# Patient Record
Sex: MALE | Race: WHITE | Employment: OTHER | URBAN - NONMETROPOLITAN AREA
[De-identification: names, ages, dates, MRNs, and addresses within clinical notes are randomized per-mention and may not be internally consistent; named-entity substitution may affect disease eponyms.]

---

## 2017-01-12 ENCOUNTER — TELEPHONE (OUTPATIENT)
Dept: PULMONOLOGY | Age: 77
End: 2017-01-12

## 2017-07-02 PROBLEM — G30.1 LATE ONSET ALZHEIMER'S DISEASE WITH BEHAVIORAL DISTURBANCE (HCC): Status: ACTIVE | Noted: 2017-07-02

## 2017-07-02 PROBLEM — F02.818 LATE ONSET ALZHEIMER'S DISEASE WITH BEHAVIORAL DISTURBANCE (HCC): Status: ACTIVE | Noted: 2017-07-02

## 2017-07-02 PROBLEM — R07.89 CHEST WALL PAIN: Status: ACTIVE | Noted: 2017-07-02

## 2017-07-02 PROBLEM — R41.0 MENTAL CONFUSION: Status: ACTIVE | Noted: 2017-07-02

## 2017-07-15 ENCOUNTER — APPOINTMENT (OUTPATIENT)
Dept: CT IMAGING | Age: 77
End: 2017-07-15
Payer: MEDICARE

## 2017-07-15 ENCOUNTER — HOSPITAL ENCOUNTER (EMERGENCY)
Age: 77
Discharge: NURSING, REHAB OR LONG-TERM CARE | End: 2017-07-15
Attending: EMERGENCY MEDICINE
Payer: MEDICARE

## 2017-07-15 ENCOUNTER — APPOINTMENT (OUTPATIENT)
Dept: GENERAL RADIOLOGY | Age: 77
End: 2017-07-15
Payer: MEDICARE

## 2017-07-15 VITALS
HEART RATE: 73 BPM | OXYGEN SATURATION: 95 % | DIASTOLIC BLOOD PRESSURE: 84 MMHG | TEMPERATURE: 98.6 F | SYSTOLIC BLOOD PRESSURE: 137 MMHG | RESPIRATION RATE: 20 BRPM | BODY MASS INDEX: 35.19 KG/M2 | WEIGHT: 205 LBS

## 2017-07-15 DIAGNOSIS — R04.2 HEMOPTYSIS: ICD-10-CM

## 2017-07-15 DIAGNOSIS — J06.9 UPPER RESPIRATORY TRACT INFECTION, UNSPECIFIED TYPE: Primary | ICD-10-CM

## 2017-07-15 DIAGNOSIS — K20.90 ESOPHAGITIS: ICD-10-CM

## 2017-07-15 DIAGNOSIS — R05.9 COUGH: ICD-10-CM

## 2017-07-15 LAB
ALBUMIN SERPL-MCNC: 3.3 G/DL (ref 3.5–5.1)
ALP BLD-CCNC: 53 U/L (ref 38–126)
ALT SERPL-CCNC: 30 U/L (ref 11–66)
AMORPHOUS: ABNORMAL
ANION GAP SERPL CALCULATED.3IONS-SCNC: 12 MEQ/L (ref 8–16)
AST SERPL-CCNC: 18 U/L (ref 5–40)
BACTERIA: ABNORMAL /HPF
BASOPHILS # BLD: 0.8 %
BASOPHILS ABSOLUTE: 0.1 THOU/MM3 (ref 0–0.1)
BILIRUB SERPL-MCNC: 0.3 MG/DL (ref 0.3–1.2)
BILIRUBIN DIRECT: < 0.2 MG/DL (ref 0–0.3)
BILIRUBIN URINE: NEGATIVE
BLOOD, URINE: NEGATIVE
BUN BLDV-MCNC: 21 MG/DL (ref 7–22)
CALCIUM SERPL-MCNC: 8.7 MG/DL (ref 8.5–10.5)
CASTS 2: ABNORMAL /LPF
CASTS UA: ABNORMAL /LPF
CHARACTER, URINE: ABNORMAL
CHLORIDE BLD-SCNC: 100 MEQ/L (ref 98–111)
CO2: 27 MEQ/L (ref 23–33)
COLOR: YELLOW
CREAT SERPL-MCNC: 0.8 MG/DL (ref 0.4–1.2)
CRYSTALS, UA: ABNORMAL
D-DIMER QUANTITATIVE: 3350 NG/ML FEU (ref 0–500)
EKG ATRIAL RATE: 85 BPM
EKG P AXIS: 27 DEGREES
EKG P-R INTERVAL: 136 MS
EKG Q-T INTERVAL: 372 MS
EKG QRS DURATION: 92 MS
EKG QTC CALCULATION (BAZETT): 442 MS
EKG R AXIS: 6 DEGREES
EKG T AXIS: 33 DEGREES
EKG VENTRICULAR RATE: 85 BPM
EOSINOPHIL # BLD: 0.1 %
EOSINOPHILS ABSOLUTE: 0 THOU/MM3 (ref 0–0.4)
EPITHELIAL CELLS, UA: ABNORMAL /HPF
GFR SERPL CREATININE-BSD FRML MDRD: > 90 ML/MIN/1.73M2
GLUCOSE BLD-MCNC: 101 MG/DL (ref 70–108)
GLUCOSE URINE: NEGATIVE MG/DL
HCT VFR BLD CALC: 39.7 % (ref 42–52)
HEMOGLOBIN: 13.1 GM/DL (ref 14–18)
KETONES, URINE: NEGATIVE
LACTIC ACID: 0.7 MMOL/L (ref 0.5–2.2)
LEUKOCYTE ESTERASE, URINE: NEGATIVE
LIPASE: 40.6 U/L (ref 5.6–51.3)
LYMPHOCYTES # BLD: 15 %
LYMPHOCYTES ABSOLUTE: 1.9 THOU/MM3 (ref 1–4.8)
MAGNESIUM: 2.5 MG/DL (ref 1.6–2.4)
MCH RBC QN AUTO: 29.3 PG (ref 27–31)
MCHC RBC AUTO-ENTMCNC: 33 GM/DL (ref 33–37)
MCV RBC AUTO: 88.6 FL (ref 80–94)
MISCELLANEOUS 2: ABNORMAL
MONOCYTES # BLD: 11.2 %
MONOCYTES ABSOLUTE: 1.4 THOU/MM3 (ref 0.4–1.3)
MUCUS: ABNORMAL
NITRITE, URINE: NEGATIVE
NUCLEATED RED BLOOD CELLS: 0 /100 WBC
OSMOLALITY CALCULATION: 280.7 MOSMOL/KG (ref 275–300)
PDW BLD-RTO: 13.9 % (ref 11.5–14.5)
PH UA: 8.5
PLATELET # BLD: 233 THOU/MM3 (ref 130–400)
PMV BLD AUTO: 8.6 MCM (ref 7.4–10.4)
POTASSIUM SERPL-SCNC: 4.6 MEQ/L (ref 3.5–5.2)
PRO-BNP: 79 PG/ML (ref 0–1800)
PROTEIN UA: NEGATIVE
RBC # BLD: 4.48 MILL/MM3 (ref 4.7–6.1)
RBC # BLD: NORMAL 10*6/UL
RBC URINE: ABNORMAL /HPF
RENAL EPITHELIAL, UA: ABNORMAL
SEG NEUTROPHILS: 72.9 %
SEGMENTED NEUTROPHILS ABSOLUTE COUNT: 9.2 THOU/MM3 (ref 1.8–7.7)
SODIUM BLD-SCNC: 139 MEQ/L (ref 135–145)
SPECIFIC GRAVITY, URINE: 1.02 (ref 1–1.03)
TOTAL PROTEIN: 6.2 G/DL (ref 6.1–8)
TROPONIN T: < 0.01 NG/ML
UROBILINOGEN, URINE: 0.2 EU/DL
WBC # BLD: 12.6 THOU/MM3 (ref 4.8–10.8)
WBC UA: ABNORMAL /HPF
YEAST: ABNORMAL

## 2017-07-15 PROCEDURE — 93005 ELECTROCARDIOGRAM TRACING: CPT | Performed by: EMERGENCY MEDICINE

## 2017-07-15 PROCEDURE — 83605 ASSAY OF LACTIC ACID: CPT

## 2017-07-15 PROCEDURE — 71275 CT ANGIOGRAPHY CHEST: CPT

## 2017-07-15 PROCEDURE — 6360000004 HC RX CONTRAST MEDICATION: Performed by: INTERNAL MEDICINE

## 2017-07-15 PROCEDURE — 83880 ASSAY OF NATRIURETIC PEPTIDE: CPT

## 2017-07-15 PROCEDURE — 71020 XR CHEST STANDARD TWO VW: CPT

## 2017-07-15 PROCEDURE — 81001 URINALYSIS AUTO W/SCOPE: CPT

## 2017-07-15 PROCEDURE — 82248 BILIRUBIN DIRECT: CPT

## 2017-07-15 PROCEDURE — 74176 CT ABD & PELVIS W/O CONTRAST: CPT

## 2017-07-15 PROCEDURE — 84484 ASSAY OF TROPONIN QUANT: CPT

## 2017-07-15 PROCEDURE — 80053 COMPREHEN METABOLIC PANEL: CPT

## 2017-07-15 PROCEDURE — 83690 ASSAY OF LIPASE: CPT

## 2017-07-15 PROCEDURE — 99285 EMERGENCY DEPT VISIT HI MDM: CPT

## 2017-07-15 PROCEDURE — 85025 COMPLETE CBC W/AUTO DIFF WBC: CPT

## 2017-07-15 PROCEDURE — 85379 FIBRIN DEGRADATION QUANT: CPT

## 2017-07-15 PROCEDURE — 83735 ASSAY OF MAGNESIUM: CPT

## 2017-07-15 PROCEDURE — 36415 COLL VENOUS BLD VENIPUNCTURE: CPT

## 2017-07-15 RX ORDER — 0.9 % SODIUM CHLORIDE 0.9 %
1000 INTRAVENOUS SOLUTION INTRAVENOUS ONCE
Status: DISCONTINUED | OUTPATIENT
Start: 2017-07-15 | End: 2017-07-15

## 2017-07-15 RX ORDER — PANTOPRAZOLE SODIUM 20 MG/1
40 TABLET, DELAYED RELEASE ORAL DAILY
Qty: 30 TABLET | Refills: 0 | Status: SHIPPED | OUTPATIENT
Start: 2017-07-15 | End: 2017-10-12 | Stop reason: CLARIF

## 2017-07-15 RX ORDER — AZITHROMYCIN 250 MG/1
TABLET, FILM COATED ORAL
Qty: 1 PACKET | Refills: 0 | Status: SHIPPED | OUTPATIENT
Start: 2017-07-15 | End: 2017-07-25

## 2017-07-15 RX ADMIN — IOPAMIDOL 85 ML: 755 INJECTION, SOLUTION INTRAVENOUS at 11:12

## 2017-07-15 ASSESSMENT — ENCOUNTER SYMPTOMS
SHORTNESS OF BREATH: 0
DIARRHEA: 0
COUGH: 1
EYE DISCHARGE: 0
RHINORRHEA: 0
ABDOMINAL PAIN: 1
EYE ITCHING: 0
WHEEZING: 0
NAUSEA: 0
ABDOMINAL DISTENTION: 0

## 2017-07-15 ASSESSMENT — PAIN SCALES - GENERAL: PAINLEVEL_OUTOF10: 6

## 2017-07-15 ASSESSMENT — PAIN DESCRIPTION - PAIN TYPE: TYPE: ACUTE PAIN

## 2017-07-15 ASSESSMENT — PAIN DESCRIPTION - FREQUENCY: FREQUENCY: CONTINUOUS

## 2017-07-15 ASSESSMENT — PAIN DESCRIPTION - ORIENTATION: ORIENTATION: MID

## 2017-07-15 ASSESSMENT — PAIN DESCRIPTION - LOCATION: LOCATION: CHEST

## 2017-07-15 ASSESSMENT — PAIN DESCRIPTION - DESCRIPTORS: DESCRIPTORS: ACHING

## 2017-08-20 ENCOUNTER — HOSPITAL ENCOUNTER (EMERGENCY)
Age: 77
Discharge: HOME | End: 2017-08-21
Payer: MEDICARE

## 2017-08-20 ENCOUNTER — APPOINTMENT (OUTPATIENT)
Dept: GENERAL RADIOLOGY | Age: 77
End: 2017-08-20
Payer: MEDICARE

## 2017-08-20 VITALS
RESPIRATION RATE: 14 BRPM | BODY MASS INDEX: 35 KG/M2 | WEIGHT: 205 LBS | OXYGEN SATURATION: 95 % | DIASTOLIC BLOOD PRESSURE: 68 MMHG | TEMPERATURE: 98.8 F | HEIGHT: 64 IN | HEART RATE: 78 BPM | SYSTOLIC BLOOD PRESSURE: 104 MMHG

## 2017-08-20 DIAGNOSIS — N39.0 URINARY TRACT INFECTION WITHOUT HEMATURIA, SITE UNSPECIFIED: Primary | ICD-10-CM

## 2017-08-20 LAB
ALBUMIN SERPL-MCNC: 2.5 G/DL (ref 3.5–5.1)
ALP BLD-CCNC: 52 U/L (ref 38–126)
ALT SERPL-CCNC: 15 U/L (ref 11–66)
ANION GAP SERPL CALCULATED.3IONS-SCNC: 10 MEQ/L (ref 8–16)
AST SERPL-CCNC: 20 U/L (ref 5–40)
BACTERIA: ABNORMAL
BASOPHILS # BLD: 0.6 %
BASOPHILS ABSOLUTE: 0.1 THOU/MM3 (ref 0–0.1)
BILIRUB SERPL-MCNC: < 0.2 MG/DL (ref 0.3–1.2)
BILIRUBIN URINE: NEGATIVE
BLOOD, URINE: NEGATIVE
BUN BLDV-MCNC: 13 MG/DL (ref 7–22)
CALCIUM SERPL-MCNC: 8 MG/DL (ref 8.5–10.5)
CASTS: ABNORMAL /LPF
CASTS: ABNORMAL /LPF
CHARACTER, URINE: ABNORMAL
CHLORIDE BLD-SCNC: 105 MEQ/L (ref 98–111)
CO2: 27 MEQ/L (ref 23–33)
COLOR: ABNORMAL
CREAT SERPL-MCNC: 0.9 MG/DL (ref 0.4–1.2)
CRYSTALS: ABNORMAL
EOSINOPHIL # BLD: 2.7 %
EOSINOPHILS ABSOLUTE: 0.4 THOU/MM3 (ref 0–0.4)
EPITHELIAL CELLS, UA: ABNORMAL /HPF
GFR SERPL CREATININE-BSD FRML MDRD: 81 ML/MIN/1.73M2
GLUCOSE BLD-MCNC: 125 MG/DL (ref 70–108)
GLUCOSE, URINE: NEGATIVE MG/DL
HCT VFR BLD CALC: 38.4 % (ref 42–52)
HEMOGLOBIN: 12.5 GM/DL (ref 14–18)
KETONES, URINE: ABNORMAL
LEUKOCYTE ESTERASE, URINE: ABNORMAL
LYMPHOCYTES # BLD: 15.7 %
LYMPHOCYTES ABSOLUTE: 2.1 THOU/MM3 (ref 1–4.8)
MCH RBC QN AUTO: 29.1 PG (ref 27–31)
MCHC RBC AUTO-ENTMCNC: 32.6 GM/DL (ref 33–37)
MCV RBC AUTO: 89.2 FL (ref 80–94)
MISCELLANEOUS LAB TEST RESULT: ABNORMAL
MONOCYTES # BLD: 9.4 %
MONOCYTES ABSOLUTE: 1.3 THOU/MM3 (ref 0.4–1.3)
MUCUS: ABNORMAL
NITRITE, URINE: NEGATIVE
NUCLEATED RED BLOOD CELLS: 0 /100 WBC
OSMOLALITY CALCULATION: 284.7 MOSMOL/KG (ref 275–300)
PDW BLD-RTO: 14.3 % (ref 11.5–14.5)
PH UA: 5.5
PLATELET # BLD: 255 THOU/MM3 (ref 130–400)
PMV BLD AUTO: 8.4 MCM (ref 7.4–10.4)
POTASSIUM SERPL-SCNC: 4 MEQ/L (ref 3.5–5.2)
PROTEIN UA: ABNORMAL MG/DL
RBC # BLD: 4.3 MILL/MM3 (ref 4.7–6.1)
RBC # BLD: NORMAL 10*6/UL
RBC URINE: ABNORMAL /HPF
RENAL EPITHELIAL, UA: ABNORMAL
SEG NEUTROPHILS: 71.6 %
SEGMENTED NEUTROPHILS ABSOLUTE COUNT: 9.6 THOU/MM3 (ref 1.8–7.7)
SODIUM BLD-SCNC: 142 MEQ/L (ref 135–145)
SPECIFIC GRAVITY UA: 1.03 (ref 1–1.03)
TOTAL PROTEIN: 4.9 G/DL (ref 6.1–8)
UROBILINOGEN, URINE: 0.2 EU/DL
WBC # BLD: 13.4 THOU/MM3 (ref 4.8–10.8)
WBC UA: ABNORMAL /HPF
YEAST: ABNORMAL

## 2017-08-20 PROCEDURE — 81001 URINALYSIS AUTO W/SCOPE: CPT

## 2017-08-20 PROCEDURE — 85025 COMPLETE CBC W/AUTO DIFF WBC: CPT

## 2017-08-20 PROCEDURE — 36415 COLL VENOUS BLD VENIPUNCTURE: CPT

## 2017-08-20 PROCEDURE — 6370000000 HC RX 637 (ALT 250 FOR IP): Performed by: PHYSICIAN ASSISTANT

## 2017-08-20 PROCEDURE — 99284 EMERGENCY DEPT VISIT MOD MDM: CPT

## 2017-08-20 PROCEDURE — 80053 COMPREHEN METABOLIC PANEL: CPT

## 2017-08-20 PROCEDURE — 72100 X-RAY EXAM L-S SPINE 2/3 VWS: CPT

## 2017-08-20 RX ORDER — SULFAMETHOXAZOLE AND TRIMETHOPRIM 800; 160 MG/1; MG/1
1 TABLET ORAL ONCE
Status: COMPLETED | OUTPATIENT
Start: 2017-08-20 | End: 2017-08-20

## 2017-08-20 RX ORDER — SULFAMETHOXAZOLE AND TRIMETHOPRIM 800; 160 MG/1; MG/1
1 TABLET ORAL 2 TIMES DAILY
Qty: 20 TABLET | Refills: 0 | Status: SHIPPED | OUTPATIENT
Start: 2017-08-20 | End: 2017-08-30

## 2017-08-20 RX ADMIN — SULFAMETHOXAZOLE AND TRIMETHOPRIM 1 TABLET: 800; 160 TABLET ORAL at 23:31

## 2017-08-20 ASSESSMENT — ENCOUNTER SYMPTOMS
EYE REDNESS: 0
COUGH: 0
BACK PAIN: 1
RHINORRHEA: 0
WHEEZING: 0
EYE DISCHARGE: 0
NAUSEA: 0
DIARRHEA: 0
SHORTNESS OF BREATH: 0
ABDOMINAL PAIN: 0
SORE THROAT: 0
VOMITING: 0

## 2017-08-20 ASSESSMENT — PAIN DESCRIPTION - FREQUENCY: FREQUENCY: INTERMITTENT

## 2017-08-20 ASSESSMENT — PAIN DESCRIPTION - PROGRESSION: CLINICAL_PROGRESSION: NOT CHANGED

## 2017-08-20 ASSESSMENT — PAIN DESCRIPTION - ORIENTATION: ORIENTATION: LOWER;LEFT;RIGHT

## 2017-08-20 ASSESSMENT — PAIN SCALES - GENERAL: PAINLEVEL_OUTOF10: 8

## 2017-08-20 ASSESSMENT — PAIN DESCRIPTION - PAIN TYPE: TYPE: ACUTE PAIN;CHRONIC PAIN

## 2017-08-20 ASSESSMENT — PAIN DESCRIPTION - DESCRIPTORS: DESCRIPTORS: ACHING

## 2017-08-20 ASSESSMENT — PAIN DESCRIPTION - LOCATION: LOCATION: BACK

## 2017-08-23 ENCOUNTER — HOSPITAL ENCOUNTER (EMERGENCY)
Age: 77
Discharge: HOME | End: 2017-08-23
Attending: EMERGENCY MEDICINE
Payer: MEDICARE

## 2017-08-23 ENCOUNTER — APPOINTMENT (OUTPATIENT)
Dept: CT IMAGING | Age: 77
End: 2017-08-23
Payer: MEDICARE

## 2017-08-23 ENCOUNTER — APPOINTMENT (OUTPATIENT)
Dept: GENERAL RADIOLOGY | Age: 77
End: 2017-08-23
Payer: MEDICARE

## 2017-08-23 VITALS
SYSTOLIC BLOOD PRESSURE: 120 MMHG | TEMPERATURE: 98 F | RESPIRATION RATE: 18 BRPM | DIASTOLIC BLOOD PRESSURE: 78 MMHG | OXYGEN SATURATION: 98 % | HEART RATE: 87 BPM

## 2017-08-23 DIAGNOSIS — S00.03XA HEMATOMA OF SCALP, INITIAL ENCOUNTER: Primary | ICD-10-CM
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
## 2017-08-23 LAB
ANION GAP SERPL CALCULATED.3IONS-SCNC: 13 MEQ/L (ref 8–16)
BUN BLDV-MCNC: 12 MG/DL (ref 7–22)
CALCIUM SERPL-MCNC: 8.4 MG/DL (ref 8.5–10.5)
CHLORIDE BLD-SCNC: 102 MEQ/L (ref 98–111)
CO2: 24 MEQ/L (ref 23–33)
CREAT SERPL-MCNC: 1 MG/DL (ref 0.4–1.2)
DIFFERENTIAL, MANUAL: NORMAL
GFR SERPL CREATININE-BSD FRML MDRD: 72 ML/MIN/1.73M2
GLUCOSE BLD-MCNC: 88 MG/DL (ref 70–108)
OSMOLALITY CALCULATION: 276.7 MOSMOL/KG (ref 275–300)
POTASSIUM SERPL-SCNC: 4.7 MEQ/L (ref 3.5–5.2)
SODIUM BLD-SCNC: 139 MEQ/L (ref 135–145)
TROPONIN T: < 0.01 NG/ML

## 2017-08-23 PROCEDURE — 84484 ASSAY OF TROPONIN QUANT: CPT

## 2017-08-23 PROCEDURE — 71010 XR CHEST PORTABLE: CPT

## 2017-08-23 PROCEDURE — 70450 CT HEAD/BRAIN W/O DYE: CPT

## 2017-08-23 PROCEDURE — 93005 ELECTROCARDIOGRAM TRACING: CPT

## 2017-08-23 PROCEDURE — 99284 EMERGENCY DEPT VISIT MOD MDM: CPT

## 2017-08-23 PROCEDURE — 80048 BASIC METABOLIC PNL TOTAL CA: CPT

## 2017-08-23 PROCEDURE — 85025 COMPLETE CBC W/AUTO DIFF WBC: CPT

## 2017-08-23 PROCEDURE — 36415 COLL VENOUS BLD VENIPUNCTURE: CPT

## 2017-08-23 RX ORDER — SODIUM CHLORIDE 9 MG/ML
INJECTION, SOLUTION INTRAVENOUS CONTINUOUS
Status: DISCONTINUED | OUTPATIENT
Start: 2017-08-23 | End: 2017-08-24 | Stop reason: HOSPADM

## 2017-08-23 RX ORDER — ONDANSETRON 2 MG/ML
4 INJECTION INTRAMUSCULAR; INTRAVENOUS EVERY 30 MIN PRN
Status: DISCONTINUED | OUTPATIENT
Start: 2017-08-23 | End: 2017-08-24 | Stop reason: HOSPADM

## 2017-08-23 ASSESSMENT — ENCOUNTER SYMPTOMS
SHORTNESS OF BREATH: 0
COUGH: 0
ABDOMINAL PAIN: 0
VOMITING: 0
NAUSEA: 0
WHEEZING: 0
SORE THROAT: 0
DIARRHEA: 0
BACK PAIN: 0
BLOOD IN STOOL: 0

## 2017-08-23 ASSESSMENT — PAIN DESCRIPTION - PAIN TYPE: TYPE: ACUTE PAIN

## 2017-08-23 ASSESSMENT — PAIN DESCRIPTION - FREQUENCY: FREQUENCY: CONTINUOUS

## 2017-08-23 ASSESSMENT — PAIN DESCRIPTION - LOCATION: LOCATION: HEAD

## 2017-08-23 ASSESSMENT — PAIN SCALES - GENERAL: PAINLEVEL_OUTOF10: 4

## 2017-08-24 LAB
BANDED NEUTROPHILS ABSOLUTE COUNT: 1 THOU/MM3
BANDS PRESENT: 5 %
BASOPHILS # BLD: 0 %
BASOPHILS ABSOLUTE: 0 THOU/MM3 (ref 0–0.1)
EKG ATRIAL RATE: 90 BPM
EKG P AXIS: 2 DEGREES
EKG P-R INTERVAL: 142 MS
EKG Q-T INTERVAL: 352 MS
EKG QRS DURATION: 70 MS
EKG QTC CALCULATION (BAZETT): 430 MS
EKG R AXIS: 28 DEGREES
EKG T AXIS: 56 DEGREES
EKG VENTRICULAR RATE: 90 BPM
EOSINOPHIL # BLD: 1 %
EOSINOPHILS ABSOLUTE: 0.2 THOU/MM3 (ref 0–0.4)
HCT VFR BLD CALC: 41.3 % (ref 42–52)
HEMOGLOBIN: 13.5 GM/DL (ref 14–18)
LYMPHOCYTES # BLD: 7 %
LYMPHOCYTES ABSOLUTE: 1.4 THOU/MM3 (ref 1–4.8)
MCH RBC QN AUTO: 29.4 PG (ref 27–31)
MCHC RBC AUTO-ENTMCNC: 32.7 GM/DL (ref 33–37)
MCV RBC AUTO: 89.9 FL (ref 80–94)
MONOCYTES # BLD: 7 %
MONOCYTES ABSOLUTE: 1.4 THOU/MM3 (ref 0.4–1.3)
NUCLEATED RED BLOOD CELLS: 0 /100 WBC
PATHOLOGIST REVIEW: ABNORMAL
PDW BLD-RTO: 13.9 % (ref 11.5–14.5)
PLATELET # BLD: 252 THOU/MM3 (ref 130–400)
PLATELET ESTIMATE: ADEQUATE
PMV BLD AUTO: 8.6 MCM (ref 7.4–10.4)
RBC # BLD: 4.6 MILL/MM3 (ref 4.7–6.1)
RBC # BLD: NORMAL 10*6/UL
SEG NEUTROPHILS: 80 %
SEGMENTED NEUTROPHILS ABSOLUTE COUNT: 15.8 THOU/MM3 (ref 1.8–7.7)
TOXIC GRANULATION: ABNORMAL
WBC # BLD: 19.7 THOU/MM3 (ref 4.8–10.8)

## 2017-09-20 ENCOUNTER — APPOINTMENT (OUTPATIENT)
Dept: CT IMAGING | Age: 77
End: 2017-09-20
Payer: MEDICARE

## 2017-09-20 ENCOUNTER — HOSPITAL ENCOUNTER (EMERGENCY)
Age: 77
Discharge: ANOTHER HOSPITAL | End: 2017-09-20
Attending: FAMILY MEDICINE
Payer: MEDICARE

## 2017-09-20 ENCOUNTER — APPOINTMENT (OUTPATIENT)
Dept: GENERAL RADIOLOGY | Age: 77
End: 2017-09-20
Payer: MEDICARE

## 2017-09-20 VITALS
HEART RATE: 97 BPM | OXYGEN SATURATION: 96 % | SYSTOLIC BLOOD PRESSURE: 135 MMHG | WEIGHT: 200 LBS | TEMPERATURE: 97.9 F | RESPIRATION RATE: 22 BRPM | DIASTOLIC BLOOD PRESSURE: 64 MMHG | BODY MASS INDEX: 34.33 KG/M2

## 2017-09-20 DIAGNOSIS — T14.8XXA BLEEDING FROM WOUND: Primary | ICD-10-CM

## 2017-09-20 LAB
ADENOVIRUS F 40 41 PCR: NOT DETECTED
ANION GAP SERPL CALCULATED.3IONS-SCNC: 10 MEQ/L (ref 8–16)
ANISOCYTOSIS: ABNORMAL
ASTROVIRUS PCR: NOT DETECTED
BASOPHILS # BLD: 0.3 %
BASOPHILS ABSOLUTE: 0 THOU/MM3 (ref 0–0.1)
BUN BLDV-MCNC: 19 MG/DL (ref 7–22)
CALCIUM SERPL-MCNC: 7.6 MG/DL (ref 8.5–10.5)
CAMPYLOBACTER PCR: NOT DETECTED
CHLORIDE BLD-SCNC: 106 MEQ/L (ref 98–111)
CLOSTRIDIUM DIFFICILE, PCR: DETECTED
CO2: 27 MEQ/L (ref 23–33)
CREAT SERPL-MCNC: 0.7 MG/DL (ref 0.4–1.2)
CRYPTOSPORIDIUM PCR: NOT DETECTED
CYCLOSPORIDIUM GI FILM ARRAY: NOT DETECTED
E COLI 0157 PCR: ABNORMAL
E COLI ENTEROAGGREGATIVE PCR: NOT DETECTED
E COLI ENTEROPATHOGENIC PCR: NOT DETECTED
E COLI ENTEROTOXIGENIC PCR: NOT DETECTED
E COLI SHIGA LIKE TOXIN PCR: NOT DETECTED
E COLI SHIGELLA/ENTEROINVASIVE PCR: NOT DETECTED
E HISTOLYTICA GI FILM ARRAY: NOT DETECTED
EOSINOPHIL # BLD: 2.4 %
EOSINOPHILS ABSOLUTE: 0.3 THOU/MM3 (ref 0–0.4)
GFR SERPL CREATININE-BSD FRML MDRD: > 90 ML/MIN/1.73M2
GIARDIA LAMBLIA PCR: NOT DETECTED
GLUCOSE BLD-MCNC: 108 MG/DL (ref 70–108)
HCT VFR BLD CALC: 29.3 % (ref 42–52)
HEMOGLOBIN: 9.5 GM/DL (ref 14–18)
INR BLD: 1.21 (ref 0.85–1.13)
LYMPHOCYTES # BLD: 7.2 %
LYMPHOCYTES ABSOLUTE: 1 THOU/MM3 (ref 1–4.8)
MCH RBC QN AUTO: 29.8 PG (ref 27–31)
MCHC RBC AUTO-ENTMCNC: 32.5 GM/DL (ref 33–37)
MCV RBC AUTO: 91.8 FL (ref 80–94)
MONOCYTES # BLD: 8 %
MONOCYTES ABSOLUTE: 1.1 THOU/MM3 (ref 0.4–1.3)
NOROVIRUS GI GII PCR: NOT DETECTED
NUCLEATED RED BLOOD CELLS: 0 /100 WBC
OSMOLALITY CALCULATION: 287.8 MOSMOL/KG (ref 275–300)
PDW BLD-RTO: 16.6 % (ref 11.5–14.5)
PLATELET # BLD: 355 THOU/MM3 (ref 130–400)
PLESIOMONAS SHIGELLOIDES PCR: NOT DETECTED
PMV BLD AUTO: 8.4 MCM (ref 7.4–10.4)
POTASSIUM SERPL-SCNC: 3.2 MEQ/L (ref 3.5–5.2)
RBC # BLD: 3.2 MILL/MM3 (ref 4.7–6.1)
RBC # BLD: NORMAL 10*6/UL
ROTAVIRUS A PCR: NOT DETECTED
SALMONELLA PCR: NOT DETECTED
SAPOVIRUS PCR: NOT DETECTED
SEG NEUTROPHILS: 82.1 %
SEGMENTED NEUTROPHILS ABSOLUTE COUNT: 11.2 THOU/MM3 (ref 1.8–7.7)
SODIUM BLD-SCNC: 143 MEQ/L (ref 135–145)
VIBRIO CHOLERAE PCR: NOT DETECTED
VIBRIO PCR: NOT DETECTED
WBC # BLD: 13.7 THOU/MM3 (ref 4.8–10.8)
YERSINIA ENTEROCOLITICA PCR: NOT DETECTED

## 2017-09-20 PROCEDURE — 80048 BASIC METABOLIC PNL TOTAL CA: CPT

## 2017-09-20 PROCEDURE — 12001 RPR S/N/AX/GEN/TRNK 2.5CM/<: CPT

## 2017-09-20 PROCEDURE — 85610 PROTHROMBIN TIME: CPT

## 2017-09-20 PROCEDURE — 87507 IADNA-DNA/RNA PROBE TQ 12-25: CPT

## 2017-09-20 PROCEDURE — A6222 GAUZE <=16 IN NO W/SAL W/O B: HCPCS

## 2017-09-20 PROCEDURE — 6360000004 HC RX CONTRAST MEDICATION: Performed by: NURSE PRACTITIONER

## 2017-09-20 PROCEDURE — 36415 COLL VENOUS BLD VENIPUNCTURE: CPT

## 2017-09-20 PROCEDURE — 99285 EMERGENCY DEPT VISIT HI MDM: CPT

## 2017-09-20 PROCEDURE — 6370000000 HC RX 637 (ALT 250 FOR IP): Performed by: NURSE PRACTITIONER

## 2017-09-20 PROCEDURE — 71010 XR CHEST PORTABLE: CPT

## 2017-09-20 PROCEDURE — 73701 CT LOWER EXTREMITY W/DYE: CPT

## 2017-09-20 PROCEDURE — 85025 COMPLETE CBC W/AUTO DIFF WBC: CPT

## 2017-09-20 RX ORDER — WARFARIN SODIUM 2 MG/1
2 TABLET ORAL DAILY
COMMUNITY
End: 2017-10-12 | Stop reason: CLARIF

## 2017-09-20 RX ORDER — QUETIAPINE FUMARATE 25 MG/1
25 TABLET, FILM COATED ORAL ONCE
Status: COMPLETED | OUTPATIENT
Start: 2017-09-20 | End: 2017-09-20

## 2017-09-20 RX ADMIN — IOPAMIDOL 80 ML: 755 INJECTION, SOLUTION INTRAVENOUS at 06:16

## 2017-09-20 RX ADMIN — QUETIAPINE FUMARATE 25 MG: 25 TABLET ORAL at 04:28

## 2017-09-20 ASSESSMENT — PAIN SCALES - GENERAL: PAINLEVEL_OUTOF10: 5

## 2017-09-20 ASSESSMENT — PAIN DESCRIPTION - ORIENTATION: ORIENTATION: RIGHT;LOWER

## 2017-09-20 ASSESSMENT — PAIN DESCRIPTION - LOCATION: LOCATION: KNEE

## 2017-09-20 ASSESSMENT — PAIN DESCRIPTION - PAIN TYPE: TYPE: ACUTE PAIN

## 2017-09-20 NOTE — ED NOTES
Pt is very sleepy, EMS reported pt slept the entire period of transport.       Dalia Napier RN  09/20/17 0481

## 2017-09-20 NOTE — ED PROVIDER NOTES
[address]       Chief Complaint   Patient presents with    Post-op Problem       Nurses Notes reviewed and I agree except as noted in the HPI. HISTORY OF PRESENT ILLNESS    Harpreet Talley is a 76 y.o. male who presents to the emergency room from [facility] for evaluation of postoperative bleeding from a right above-the-knee amputation that was performed on Wednesday. The patient is confused from Alzheimer's and dementia and unable to answer questions. Per EMS nursing staff at the nursing home noted bleeding from the surgical site. When EMS arrived bleeding was under control however nursing staff requested transport. REVIEW OF SYSTEMS     Review of Systems   Unable to perform ROS: Dementia        PAST MEDICAL HISTORY    has a past medical history of Alzheimer disease; Cerebral artery occlusion with cerebral infarction [facility]); COPD (chronic obstructive pulmonary disease) ([facility] [address] CVA (cerebral infarction); Dementia; Foot drop, left; GERD (gastroesophageal reflux disease); Sleep apnea; Thyroid disease; and Ulcer ([facility] [address] SURGICAL HISTORY      has a past surgical history that includes back surgery; Appendectomy; Colonoscopy; and Endoscopy, colon, diagnostic.     CURRENT MEDICATIONS       Previous Medications    FINASTERIDE (PROSCAR) 5 MG TABLET    Take 5 mg by mouth daily    IPRATROPIUM-ALBUTEROL (DUONEB) 0.5-2.5 (3) MG/3ML SOLN NEBULIZER SOLUTION    Inhale 3 mLs into the lungs every 4 hours (while awake)    LEVOTHYROXINE (SYNTHROID) 100 MCG TABLET    Take 100 mcg by mouth Daily    MIRTAZAPINE (REMERON) 15 MG TABLET    Take 1 tablet by mouth nightly    MULTIPLE VITAMINS-MINERALS (CERTAGEN PO)    Take by mouth daily     NITROGLYCERIN (NITROSTAT) 0.4 MG SL TABLET    Place 1 tablet under the tongue every 5 minutes as needed for Chest pain    PANTOPRAZOLE (PROTONIX) 20 MG TABLET    Take 2 tablets by mouth daily for 30 doses    QUETIAPINE (SEROQUEL) 25 MG TABLET Take 1 tablet by mouth nightly    RIVASTIGMINE (EXELON) 4.6 MG/24HR    Place 1 patch onto the skin daily    TAMSULOSIN (FLOMAX) 0.4 MG CAPSULE    Take 1 capsule by mouth daily       ALLERGIES     is allergic to acetaminophen; donepezil hcl; and namenda [memantine hcl]. FAMILY HISTORY     indicated that the status of his mother is unknown.  family history includes Other in his mother. SOCIAL HISTORY      reports that he quit smoking about 8 years ago. His smoking use included Cigarettes. He smoked 1.50 packs per day. He has never used smokeless tobacco. He reports that he does not drink alcohol or use illicit drugs. PHYSICAL EXAM     INITIAL VITALS:  weight is 200 lb (90.7 kg). His oral temperature is 97.9 °F (36.6 °C). His blood pressure is 137/72 and his pulse is 91. His respiration is 23 and oxygen saturation is 97%. Physical Exam   Constitutional: He appears well-developed and well-nourished. HENT:   Head: Normocephalic. Cardiovascular: Normal rate, regular rhythm, normal heart sounds and intact distal pulses. Pulmonary/Chest: Effort normal. He has rales (Right base). Abdominal: Soft. Bowel sounds are normal.   Neurological:   Arousable. Disoriented to person place time or situation   Skin: Skin is warm and dry. There is no incision at the above-the-knee amputation site that is well approximated, closed with staples, no signs of infection or hematoma. To the lateral aspect, approximately one third of the way to the Center is a very small trickle of oozing blood. Pulses are intact. There is an additional surgical incision at the right groin that is well approximated, clean, no drainage. Staples are in place.      Psychiatric:   Sleepy, arousable, confused and yelling out         DIFFERENTIAL DIAGNOSIS:   Including but not limited to wound dehiscence, hematoma, infection    DIAGNOSTIC RESULTS     EKG: All EKG's are interpreted by the Emergency Department Physician who either signs or versus other pathology. CT shows a significant hematoma. I discussed the case with Dr. Jaylene Brothers of [facility] (the patient's vascular surgeon). She graciously accepts the patient for transfer. She requests two sutures be placed in the incision line, one on each side of the bleeding. He will be transferred by ground. Medications - No data to display      Patient was seen independently by myself. The Patient's final impression and disposition and plan was determined by myself. CRITICAL CARE:   None    CONSULTS:  Dr. Albarado Huerta:  [facility]  Date/Time: 9/20/2017 7:43 AM  Performed by: Mahsa Kerr by: Patricia Olivas     Anesthesia (see MAR for exact dosages): Anesthesia method:  Local infiltration    Local anesthetic:  Lidocaine 1% w/o epi  Laceration details:     Location: incision at the right AKA stump. Repair type:     Repair type:  Simple  Pre-procedure details:     Preparation:  Patient was prepped and draped in usual sterile fashion  Exploration:     Hemostasis achieved with:  Direct pressure    Contaminated: no    Treatment:     Area cleansed with:  Moises    Amount of cleaning:  Standard  Skin repair:     Repair method:  Sutures    Suture size:  5-0    Suture material:  Nylon    Suture technique:  Simple interrupted    Number of sutures:  2  Approximation:     Approximation:  Close    Vermilion border: well-aligned    Post-procedure details:     Dressing:  Open (no dressing)    Patient tolerance of procedure: Tolerated well, no immediate complications  Comments:      Performed by Kiah Yang CNP student          FINAL IMPRESSION      1.  Bleeding from wound          DISPOSITION/PLAN   Transfer    PATIENT REFERRED TO:  Russell Hamilton MD  [address] [facility] [address] [facility]  [phone]    Schedule an appointment as soon as possible for a visit in 1 day  For follow up    Surgeon  Call in the morning and notify of small amount of bleeding from the site          DISCHARGE MEDICATIONS:  New Prescriptions    No medications on file       (Please note that portions of this note were completed with a voice recognition program.  Efforts were made to edit the dictations but occasionally words are mis-transcribed.)    CARLYN Mills NP  09/20/17 0319       Deborah Colon NP  09/20/17 4707       Deborah Colon NP  09/20/17 5097

## 2017-09-20 NOTE — ED NOTES
Pt is a right above the knee amputee. There are 40 stapes noted. The 22nd staple starting from the medial aspect of the incision is not intact. The incision was gaping between stapes 25-26, two sutures were placed there to help stop oozing of incision. Pt is confused, calling out. Will continue to monitor.       Jose Eduardo Haji RN  09/20/17 5522

## 2017-09-20 NOTE — ED NOTES
Pt's wife notified of pt's transfer and plan of care. Pt's wife states she is on her way up to hospital at this time. Currently pt is resting in bed calling out. Pt states he wants someone to sit in the room with him. Will continue to monitor.       Quiana Mittal RN  09/20/17 3430

## 2017-09-20 NOTE — ED AVS SNAPSHOT
After Visit Summary  (Discharge Instructions)    Medication List for Home    Based on the information you provided to us as well as any changes during this visit, the following is your updated medication list.  Compare this with your prescription bottles at home. If you have any questions or concerns, contact your primary care physician's office. Daily Medication List (This medication list can be shared with any Healthcare provider who is helping you manage your medications)      ASK your doctor about these medications if you have questions     CERTAGEN PO   Take by mouth daily       finasteride 5 MG tablet   Commonly known as:  PROSCAR   Take 5 mg by mouth daily       ipratropium-albuterol 0.5-2.5 (3) MG/3ML Soln nebulizer solution   Commonly known as:  DUONEB   Inhale 3 mLs into the lungs every 4 hours (while awake)       levothyroxine 100 MCG tablet   Commonly known as:  SYNTHROID   Take 100 mcg by mouth Daily       mirtazapine 15 MG tablet   Commonly known as:  REMERON   Take 1 tablet by mouth nightly       nitroGLYCERIN 0.4 MG SL tablet   Commonly known as:  NITROSTAT   Place 1 tablet under the tongue every 5 minutes as needed for Chest pain       pantoprazole 20 MG tablet   Commonly known as:  PROTONIX   Take 2 tablets by mouth daily for 30 doses       QUEtiapine 25 MG tablet   Commonly known as:  SEROQUEL   Take 1 tablet by mouth nightly       rivastigmine 4.6 MG/24HR   Commonly known as:  EXELON   Place 1 patch onto the skin daily       tamsulosin 0.4 MG capsule   Commonly known as:  FLOMAX   Take 1 capsule by mouth daily               Allergies as of 9/20/2017        Reactions    Acetaminophen     Donepezil Hcl Other (See Comments)    nightmares    Namenda [Memantine Hcl] Anxiety    Agitation, anxiety      Immunizations as of 9/20/2017     Name Date Dose VIS Date Route    Td 2/8/2014 0.5 mL 1/24/2012 Intramuscular         After Visit Summary    This summary was created for you. 7. Enter your Password Reset Question and Answer. This can be used at a later time if you forget your password. 8. Enter your e-mail address. You will receive e-mail notification when new information is available in [address] 9. Click Sign Up. You can now view your medical record. Additional Information  If you have questions, please contact the physician practice where you receive care. Remember, MyChart is NOT to be used for urgent needs. For medical emergencies, dial 911. For questions regarding your MyChart account call [phone]. If you have a clinical question, please call your doctor's office. View your information online  ? Review your current list of  medications, immunization, and allergies. ? Review your future test results online . ? Review your discharge instructions provided by your caregivers at discharge    Certain functionality such as prescription refills, scheduling appointments or sending messages to your provider are not activated if your provider does not use [employer] in his/her office    For questions regarding your MyChart account call [phone]. If you have a clinical question, please call your doctor's office. The information on all pages of the After Visit Summary has been reviewed with me, the patient and/or responsible adult, by my health care provider(s). I had the opportunity to ask questions regarding this information. I understand I should dispose of my armband safely at home to protect my health information. A complete copy of the After Visit Summary has been given to me, the patient and/or responsible adult.          Patient Signature/Responsible Adult: ___________________________________    Nurse Signature: ___________________________________  Resident/MLP Signature: ___________________________________  Attending Signature: ___________________________________    Date:____________Time:____________              Discharge Instructions Call 911 anytime you think you may need emergency care. For example, call if:  · You passed out (lost consciousness). Call your doctor now or seek immediate medical care if:  · You are dizzy or lightheaded, or you feel like you may faint. · You have bleeding that starts again or gets worse, such as soaking one or more bandages over 2 to 4 hours. Watch closely for changes in your health, and be sure to contact your doctor if:  · You do not get better as expected. Where can you learn more? Go to [email or web address].[email or web address] org and sign in to your [employer] account. Enter Q235 in the ScheduleThing box to learn more about \"Bleeding After Surgery: Care Instructions. \"     If you do not have an account, please click on the \"Sign Up Now\" link. Current as of: March 20, 2017  Content Version: 11.3  © 1845-0572 [employer]. Care instructions adapted under license by [facility] ([facility]). If you have questions about a medical condition or this instruction, always ask your healthcare professional. [place] [address] any warranty or liability for your use of this information.

## 2017-09-28 ENCOUNTER — CARE COORDINATION (OUTPATIENT)
Dept: MEDSURG UNIT | Age: 77
End: 2017-09-28

## 2017-10-12 ENCOUNTER — HOSPITAL ENCOUNTER (INPATIENT)
Age: 77
LOS: 5 days | Discharge: NURSING, REHAB OR LONG-TERM CARE | DRG: 871 | End: 2017-10-18
Attending: EMERGENCY MEDICINE | Admitting: ANESTHESIOLOGY
Payer: MEDICARE

## 2017-10-12 ENCOUNTER — APPOINTMENT (OUTPATIENT)
Dept: GENERAL RADIOLOGY | Age: 77
DRG: 871 | End: 2017-10-12
Payer: MEDICARE

## 2017-10-12 DIAGNOSIS — A41.9 SEPSIS, DUE TO UNSPECIFIED ORGANISM: Primary | ICD-10-CM

## 2017-10-12 LAB
ALBUMIN SERPL-MCNC: 2.6 G/DL (ref 3.5–5.1)
ALLEN TEST: ABNORMAL
ALP BLD-CCNC: 118 U/L (ref 38–126)
ALT SERPL-CCNC: 15 U/L (ref 11–66)
AMMONIA: 45 UMOL/L (ref 11–60)
ANION GAP SERPL CALCULATED.3IONS-SCNC: 14 MEQ/L (ref 8–16)
ANISOCYTOSIS: ABNORMAL
APTT: 30 SECONDS (ref 22–38)
AST SERPL-CCNC: 18 U/L (ref 5–40)
BACTERIA: ABNORMAL /HPF
BANDED NEUTROPHILS ABSOLUTE COUNT: 1.7 THOU/MM3
BANDS PRESENT: 6 %
BASE EXCESS (CALCULATED): 1.7 MMOL/L (ref -2.5–2.5)
BASOPHILS # BLD: 0 %
BASOPHILS ABSOLUTE: 0 THOU/MM3 (ref 0–0.1)
BILIRUB SERPL-MCNC: 0.6 MG/DL (ref 0.3–1.2)
BILIRUBIN DIRECT: < 0.2 MG/DL (ref 0–0.3)
BILIRUBIN URINE: ABNORMAL
BLOOD, URINE: ABNORMAL
BUN BLDV-MCNC: 24 MG/DL (ref 7–22)
CALCIUM SERPL-MCNC: 8.5 MG/DL (ref 8.5–10.5)
CASTS 2: ABNORMAL /LPF
CASTS UA: ABNORMAL /LPF
CHARACTER, URINE: ABNORMAL
CHLORIDE BLD-SCNC: 99 MEQ/L (ref 98–111)
CO2: 25 MEQ/L (ref 23–33)
COLLECTED BY:: ABNORMAL
COLOR: ABNORMAL
CREAT SERPL-MCNC: 1.2 MG/DL (ref 0.4–1.2)
CRYSTALS, UA: ABNORMAL
DEVICE: ABNORMAL
DIFFERENTIAL, MANUAL: NORMAL
EOSINOPHIL # BLD: 0 %
EOSINOPHILS ABSOLUTE: 0 THOU/MM3 (ref 0–0.4)
EPITHELIAL CELLS, UA: ABNORMAL /HPF
GFR SERPL CREATININE-BSD FRML MDRD: 58 ML/MIN/1.73M2
GLUCOSE BLD-MCNC: 156 MG/DL (ref 70–108)
GLUCOSE URINE: NEGATIVE MG/DL
HCO3: 27 MMOL/L (ref 23–28)
HCT VFR BLD CALC: 39.1 % (ref 42–52)
HEMOCCULT STL QL: NEGATIVE
HEMOGLOBIN: 12.4 GM/DL (ref 14–18)
ICTOTEST: NEGATIVE
IFIO2: 6
INR BLD: 1.16 (ref 0.85–1.13)
KETONES, URINE: NEGATIVE
LACTIC ACID: 2.3 MMOL/L (ref 0.5–2.2)
LEUKOCYTE ESTERASE, URINE: ABNORMAL
LIPASE: 11.6 U/L (ref 5.6–51.3)
LYMPHOCYTES # BLD: 0 %
LYMPHOCYTES ABSOLUTE: 0 THOU/MM3 (ref 1–4.8)
MAGNESIUM: 2 MG/DL (ref 1.6–2.4)
MCH RBC QN AUTO: 28.9 PG (ref 27–31)
MCHC RBC AUTO-ENTMCNC: 31.7 GM/DL (ref 33–37)
MCV RBC AUTO: 91 FL (ref 80–94)
MISCELLANEOUS 2: ABNORMAL
MONOCYTES # BLD: 4 %
MONOCYTES ABSOLUTE: 1.2 THOU/MM3 (ref 0.4–1.3)
NITRITE, URINE: POSITIVE
NUCLEATED RED BLOOD CELLS: 0 /100 WBC
O2 SATURATION: 92 %
OSMOLALITY CALCULATION: 282.9 MOSMOL/KG (ref 275–300)
PCO2: 45 MMHG (ref 35–45)
PDW BLD-RTO: 17.9 % (ref 11.5–14.5)
PH BLOOD GAS: 7.39 (ref 7.35–7.45)
PH UA: 8
PHOSPHORUS: 4.7 MG/DL (ref 2.4–4.7)
PLATELET # BLD: 374 THOU/MM3 (ref 130–400)
PLATELET ESTIMATE: ADEQUATE
PMV BLD AUTO: 8.4 MCM (ref 7.4–10.4)
PO2: 66 MMHG (ref 71–104)
POIKILOCYTES: SLIGHT
POTASSIUM SERPL-SCNC: 5.9 MEQ/L (ref 3.5–5.2)
PRO-BNP: 743.4 PG/ML (ref 0–1800)
PROTEIN UA: 100
RBC # BLD: 4.3 MILL/MM3 (ref 4.7–6.1)
RBC # BLD: NORMAL 10*6/UL
RBC URINE: ABNORMAL /HPF
RENAL EPITHELIAL, UA: ABNORMAL
SEG NEUTROPHILS: 90 %
SEGMENTED NEUTROPHILS ABSOLUTE COUNT: 25.9 THOU/MM3 (ref 1.8–7.7)
SODIUM BLD-SCNC: 138 MEQ/L (ref 135–145)
SOURCE, BLOOD GAS: ABNORMAL
SPECIFIC GRAVITY, URINE: 1.02 (ref 1–1.03)
TOTAL CK: 21 U/L (ref 55–170)
TOTAL PROTEIN: 6.6 G/DL (ref 6.1–8)
TROPONIN T: 0.02 NG/ML
UROBILINOGEN, URINE: 0.2 EU/DL
WBC # BLD: 28.8 THOU/MM3 (ref 4.8–10.8)
WBC UA: ABNORMAL /HPF
YEAST: ABNORMAL

## 2017-10-12 PROCEDURE — 84484 ASSAY OF TROPONIN QUANT: CPT

## 2017-10-12 PROCEDURE — 96365 THER/PROPH/DIAG IV INF INIT: CPT

## 2017-10-12 PROCEDURE — 84100 ASSAY OF PHOSPHORUS: CPT

## 2017-10-12 PROCEDURE — 96368 THER/DIAG CONCURRENT INF: CPT

## 2017-10-12 PROCEDURE — 87086 URINE CULTURE/COLONY COUNT: CPT

## 2017-10-12 PROCEDURE — 80053 COMPREHEN METABOLIC PANEL: CPT

## 2017-10-12 PROCEDURE — 81001 URINALYSIS AUTO W/SCOPE: CPT

## 2017-10-12 PROCEDURE — 83605 ASSAY OF LACTIC ACID: CPT

## 2017-10-12 PROCEDURE — 71010 XR CHEST PORTABLE: CPT

## 2017-10-12 PROCEDURE — 82272 OCCULT BLD FECES 1-3 TESTS: CPT

## 2017-10-12 PROCEDURE — 99285 EMERGENCY DEPT VISIT HI MDM: CPT

## 2017-10-12 PROCEDURE — 83735 ASSAY OF MAGNESIUM: CPT

## 2017-10-12 PROCEDURE — 87040 BLOOD CULTURE FOR BACTERIA: CPT

## 2017-10-12 PROCEDURE — 83880 ASSAY OF NATRIURETIC PEPTIDE: CPT

## 2017-10-12 PROCEDURE — 82248 BILIRUBIN DIRECT: CPT

## 2017-10-12 PROCEDURE — 85025 COMPLETE CBC W/AUTO DIFF WBC: CPT

## 2017-10-12 PROCEDURE — 82803 BLOOD GASES ANY COMBINATION: CPT

## 2017-10-12 PROCEDURE — 82140 ASSAY OF AMMONIA: CPT

## 2017-10-12 PROCEDURE — 82550 ASSAY OF CK (CPK): CPT

## 2017-10-12 PROCEDURE — 94660 CPAP INITIATION&MGMT: CPT

## 2017-10-12 PROCEDURE — 36600 WITHDRAWAL OF ARTERIAL BLOOD: CPT

## 2017-10-12 PROCEDURE — 2580000003 HC RX 258: Performed by: EMERGENCY MEDICINE

## 2017-10-12 PROCEDURE — 83690 ASSAY OF LIPASE: CPT

## 2017-10-12 PROCEDURE — 36415 COLL VENOUS BLD VENIPUNCTURE: CPT

## 2017-10-12 PROCEDURE — 93005 ELECTROCARDIOGRAM TRACING: CPT | Performed by: EMERGENCY MEDICINE

## 2017-10-12 PROCEDURE — 85730 THROMBOPLASTIN TIME PARTIAL: CPT

## 2017-10-12 PROCEDURE — 85610 PROTHROMBIN TIME: CPT

## 2017-10-12 PROCEDURE — 6360000002 HC RX W HCPCS: Performed by: EMERGENCY MEDICINE

## 2017-10-12 PROCEDURE — 2700000000 HC OXYGEN THERAPY PER DAY

## 2017-10-12 RX ORDER — BISACODYL 10 MG
10 SUPPOSITORY, RECTAL RECTAL DAILY PRN
COMMUNITY

## 2017-10-12 RX ORDER — LORAZEPAM 1 MG/1
1 TABLET ORAL EVERY 6 HOURS PRN
Status: ON HOLD | COMMUNITY
End: 2017-10-18 | Stop reason: HOSPADM

## 2017-10-12 RX ORDER — TRAZODONE HYDROCHLORIDE 50 MG/1
50 TABLET ORAL NIGHTLY
Status: ON HOLD | COMMUNITY
End: 2017-10-18 | Stop reason: HOSPADM

## 2017-10-12 RX ORDER — FOLIC ACID 1 MG/1
1 TABLET ORAL DAILY
Status: ON HOLD | COMMUNITY
End: 2017-10-18 | Stop reason: HOSPADM

## 2017-10-12 RX ORDER — POTASSIUM CHLORIDE 20 MEQ/1
40 TABLET, EXTENDED RELEASE ORAL DAILY
Status: ON HOLD | COMMUNITY
End: 2017-10-18 | Stop reason: HOSPADM

## 2017-10-12 RX ORDER — IBUPROFEN 200 MG
200 TABLET ORAL EVERY 6 HOURS PRN
Status: ON HOLD | COMMUNITY
End: 2017-10-18 | Stop reason: HOSPADM

## 2017-10-12 RX ORDER — WARFARIN SODIUM 1 MG/1
0.5 TABLET ORAL DAILY
Status: ON HOLD | COMMUNITY
End: 2017-10-18 | Stop reason: HOSPADM

## 2017-10-12 RX ORDER — LACTOBACILLUS RHAMNOSUS GG 10B CELL
1 CAPSULE ORAL 2 TIMES DAILY
Status: ON HOLD | COMMUNITY
End: 2017-10-18 | Stop reason: HOSPADM

## 2017-10-12 RX ORDER — LORATADINE 10 MG/1
10 TABLET ORAL DAILY
Status: ON HOLD | COMMUNITY
End: 2017-10-18 | Stop reason: HOSPADM

## 2017-10-12 RX ORDER — FERROUS SULFATE 325(65) MG
325 TABLET ORAL
Status: ON HOLD | COMMUNITY
End: 2017-10-18 | Stop reason: HOSPADM

## 2017-10-12 RX ORDER — LEVOFLOXACIN 5 MG/ML
500 INJECTION, SOLUTION INTRAVENOUS ONCE
Status: COMPLETED | OUTPATIENT
Start: 2017-10-12 | End: 2017-10-13

## 2017-10-12 RX ORDER — OXYCODONE HYDROCHLORIDE 5 MG/1
5 TABLET ORAL 3 TIMES DAILY
Status: ON HOLD | COMMUNITY
End: 2017-10-18 | Stop reason: HOSPADM

## 2017-10-12 RX ORDER — OXYCODONE HYDROCHLORIDE 5 MG/1
5 TABLET ORAL EVERY 4 HOURS PRN
Status: ON HOLD | COMMUNITY
End: 2017-10-18 | Stop reason: HOSPADM

## 2017-10-12 RX ORDER — OMEPRAZOLE 20 MG/1
20 CAPSULE, DELAYED RELEASE ORAL NIGHTLY
Status: ON HOLD | COMMUNITY
End: 2017-10-18 | Stop reason: HOSPADM

## 2017-10-12 RX ADMIN — VANCOMYCIN HYDROCHLORIDE 1000 MG: 1 INJECTION, POWDER, LYOPHILIZED, FOR SOLUTION INTRAVENOUS at 23:56

## 2017-10-12 RX ADMIN — LEVOFLOXACIN 500 MG: 5 INJECTION, SOLUTION INTRAVENOUS at 23:56

## 2017-10-13 ENCOUNTER — APPOINTMENT (OUTPATIENT)
Dept: GENERAL RADIOLOGY | Age: 77
DRG: 871 | End: 2017-10-13
Payer: MEDICARE

## 2017-10-13 ENCOUNTER — APPOINTMENT (OUTPATIENT)
Dept: CT IMAGING | Age: 77
DRG: 871 | End: 2017-10-13
Payer: MEDICARE

## 2017-10-13 ENCOUNTER — APPOINTMENT (OUTPATIENT)
Dept: ULTRASOUND IMAGING | Age: 77
DRG: 871 | End: 2017-10-13
Payer: MEDICARE

## 2017-10-13 PROBLEM — R06.03 ACUTE RESPIRATORY DISTRESS: Status: ACTIVE | Noted: 2017-10-13

## 2017-10-13 PROBLEM — K92.0 HEMATEMESIS: Status: ACTIVE | Noted: 2017-10-13

## 2017-10-13 PROBLEM — E43 SEVERE MALNUTRITION (HCC): Status: ACTIVE | Noted: 2017-10-13

## 2017-10-13 PROBLEM — A41.9 SEPSIS (HCC): Status: ACTIVE | Noted: 2017-10-13

## 2017-10-13 LAB
ALBUMIN SERPL-MCNC: 2.3 G/DL (ref 3.5–5.1)
ALLEN TEST: POSITIVE
ALLEN TEST: POSITIVE
ANION GAP SERPL CALCULATED.3IONS-SCNC: 15 MEQ/L (ref 8–16)
ANION GAP SERPL CALCULATED.3IONS-SCNC: 19 MEQ/L (ref 8–16)
ANISOCYTOSIS: ABNORMAL
BACTERIA: ABNORMAL
BANDED NEUTROPHILS ABSOLUTE COUNT: 2.9 THOU/MM3
BANDS PRESENT: 12 %
BASE EXCESS (CALCULATED): 1.4 MMOL/L (ref -2.5–2.5)
BASE EXCESS (CALCULATED): 1.8 MMOL/L (ref -2.5–2.5)
BASOPHILS # BLD: 0 %
BASOPHILS ABSOLUTE: 0 THOU/MM3 (ref 0–0.1)
BILIRUBIN URINE: ABNORMAL
BLOOD, URINE: ABNORMAL
BUN BLDV-MCNC: 29 MG/DL (ref 7–22)
BUN BLDV-MCNC: 38 MG/DL (ref 7–22)
CALCIUM IONIZED: 1.05 MMOL/L (ref 1.12–1.32)
CALCIUM SERPL-MCNC: 8.3 MG/DL (ref 8.5–10.5)
CALCIUM SERPL-MCNC: 8.4 MG/DL (ref 8.5–10.5)
CASTS: ABNORMAL /LPF
CHARACTER, URINE: ABNORMAL
CHLORIDE BLD-SCNC: 102 MEQ/L (ref 98–111)
CHLORIDE BLD-SCNC: 99 MEQ/L (ref 98–111)
CHLORIDE, URINE: 20 MEQ/L
CO2: 21 MEQ/L (ref 23–33)
CO2: 22 MEQ/L (ref 23–33)
COLLECTED BY:: ABNORMAL
COLLECTED BY:: NORMAL
COLOR: ABNORMAL
CREAT SERPL-MCNC: 1.8 MG/DL (ref 0.4–1.2)
CREAT SERPL-MCNC: 2 MG/DL (ref 0.4–1.2)
CREATININE URINE: 241.6 MG/DL
CRYSTALS: ABNORMAL
DEVICE: ABNORMAL
DEVICE: NORMAL
DIFFERENTIAL, MANUAL: NORMAL
EOSINOPHIL # BLD: 0 %
EOSINOPHILS ABSOLUTE: 0 THOU/MM3 (ref 0–0.4)
EPITHELIAL CELLS, UA: ABNORMAL /HPF
FLU A ANTIGEN: NEGATIVE
FLU B ANTIGEN: NEGATIVE
GFR SERPL CREATININE-BSD FRML MDRD: 32 ML/MIN/1.73M2
GFR SERPL CREATININE-BSD FRML MDRD: 37 ML/MIN/1.73M2
GLUCOSE BLD-MCNC: 123 MG/DL (ref 70–108)
GLUCOSE BLD-MCNC: 142 MG/DL (ref 70–108)
GLUCOSE, URINE: 100 MG/DL
HCO3: 26 MMOL/L (ref 23–28)
HCO3: 27 MMOL/L (ref 23–28)
HCT VFR BLD CALC: 37.8 % (ref 42–52)
HEMOGLOBIN: 12 GM/DL (ref 14–18)
ICTOTEST: NEGATIVE
IFIO2: 50
IFIO2: 60
INR BLD: 1.35 (ref 0.85–1.13)
KETONES, URINE: ABNORMAL
LACTIC ACID: 2.7 MMOL/L (ref 0.5–2.2)
LACTIC ACID: 3.4 MMOL/L (ref 0.5–2.2)
LACTIC ACID: 3.4 MMOL/L (ref 0.5–2.2)
LEUKOCYTE ESTERASE, URINE: ABNORMAL
LYMPHOCYTES # BLD: 9 %
LYMPHOCYTES ABSOLUTE: 2.2 THOU/MM3 (ref 1–4.8)
MAGNESIUM: 2 MG/DL (ref 1.6–2.4)
MCH RBC QN AUTO: 28.8 PG (ref 27–31)
MCHC RBC AUTO-ENTMCNC: 31.8 GM/DL (ref 33–37)
MCV RBC AUTO: 90.5 FL (ref 80–94)
METAMYELOCYTES: 4 %
MISCELLANEOUS LAB TEST RESULT: ABNORMAL
MONOCYTES # BLD: 5 %
MONOCYTES ABSOLUTE: 1.2 THOU/MM3 (ref 0.4–1.3)
MRSA SCREEN RT-PCR: NEGATIVE
MYELOCYTES: 2 %
NITRITE, URINE: POSITIVE
NUCLEATED RED BLOOD CELLS: 0 /100 WBC
O2 SATURATION: 81 %
O2 SATURATION: 97 %
OSMOLALITY URINE: 388 MOSMOL/KG (ref 250–750)
PCO2: 42 MMHG (ref 35–45)
PCO2: 42 MMHG (ref 35–45)
PDW BLD-RTO: 18.2 % (ref 11.5–14.5)
PH BLOOD GAS: 7.4 (ref 7.35–7.45)
PH BLOOD GAS: 7.41 (ref 7.35–7.45)
PH UA: 5
PHOSPHORUS: 4.5 MG/DL (ref 2.4–4.7)
PLATELET # BLD: 301 THOU/MM3 (ref 130–400)
PLATELET ESTIMATE: ADEQUATE
PMV BLD AUTO: 8.4 MCM (ref 7.4–10.4)
PO2: 46 MMHG (ref 71–104)
PO2: 90 MMHG (ref 71–104)
POIKILOCYTES: SLIGHT
POTASSIUM SERPL-SCNC: 5.6 MEQ/L (ref 3.5–5.2)
POTASSIUM SERPL-SCNC: 6.6 MEQ/L (ref 3.5–5.2)
POTASSIUM, URINE: 86.3 MEQ/L
PROCALCITONIN: 14.42 NG/ML (ref 0.01–0.09)
PROTEIN UA: 100 MG/DL
PROTEIN, URINE: 302.6 MG/DL
RBC # BLD: 4.18 MILL/MM3 (ref 4.7–6.1)
RBC # BLD: NORMAL 10*6/UL
RBC URINE: ABNORMAL /HPF
RENAL EPITHELIAL, UA: ABNORMAL
RETICULOCYTE ABSOLUTE COUNT: 2 % (ref 0.5–2)
SEG NEUTROPHILS: 68 %
SEGMENTED NEUTROPHILS ABSOLUTE COUNT: 16.6 THOU/MM3 (ref 1.8–7.7)
SET PEEP: 12 MMHG
SET PEEP: 12 MMHG
SODIUM BLD-SCNC: 139 MEQ/L (ref 135–145)
SODIUM BLD-SCNC: 139 MEQ/L (ref 135–145)
SODIUM URINE: 24 MEQ/L
SOURCE, BLOOD GAS: ABNORMAL
SOURCE, BLOOD GAS: NORMAL
SPECIFIC GRAVITY UA: > 1.03 (ref 1–1.03)
TROPONIN T: 0.02 NG/ML
TROPONIN T: 0.02 NG/ML
TROPONIN T: 0.03 NG/ML
TSH SERPL DL<=0.05 MIU/L-ACNC: 8.59 UIU/ML (ref 0.4–4.2)
UROBILINOGEN, URINE: 0.2 EU/DL
WBC # BLD: 24.4 THOU/MM3 (ref 4.8–10.8)
WBC UA: ABNORMAL /HPF
YEAST: ABNORMAL

## 2017-10-13 PROCEDURE — 84156 ASSAY OF PROTEIN URINE: CPT

## 2017-10-13 PROCEDURE — 99223 1ST HOSP IP/OBS HIGH 75: CPT | Performed by: INTERNAL MEDICINE

## 2017-10-13 PROCEDURE — 87804 INFLUENZA ASSAY W/OPTIC: CPT

## 2017-10-13 PROCEDURE — 75984 XRAY CONTROL CATHETER CHANGE: CPT

## 2017-10-13 PROCEDURE — 83605 ASSAY OF LACTIC ACID: CPT

## 2017-10-13 PROCEDURE — 84443 ASSAY THYROID STIM HORMONE: CPT

## 2017-10-13 PROCEDURE — 85610 PROTHROMBIN TIME: CPT

## 2017-10-13 PROCEDURE — 71010 XR CHEST SINGLE FOR NG TUBE PLACEMENT: CPT

## 2017-10-13 PROCEDURE — 6370000000 HC RX 637 (ALT 250 FOR IP): Performed by: ANESTHESIOLOGY

## 2017-10-13 PROCEDURE — 94660 CPAP INITIATION&MGMT: CPT

## 2017-10-13 PROCEDURE — 85045 AUTOMATED RETICULOCYTE COUNT: CPT

## 2017-10-13 PROCEDURE — 85025 COMPLETE CBC W/AUTO DIFF WBC: CPT

## 2017-10-13 PROCEDURE — 83735 ASSAY OF MAGNESIUM: CPT

## 2017-10-13 PROCEDURE — 71250 CT THORAX DX C-: CPT

## 2017-10-13 PROCEDURE — 87081 CULTURE SCREEN ONLY: CPT

## 2017-10-13 PROCEDURE — A6257 TRANSPARENT FILM <= 16 SQ IN: HCPCS

## 2017-10-13 PROCEDURE — 6360000002 HC RX W HCPCS: Performed by: ANESTHESIOLOGY

## 2017-10-13 PROCEDURE — 82436 ASSAY OF URINE CHLORIDE: CPT

## 2017-10-13 PROCEDURE — 84133 ASSAY OF URINE POTASSIUM: CPT

## 2017-10-13 PROCEDURE — 89190 NASAL SMEAR FOR EOSINOPHILS: CPT

## 2017-10-13 PROCEDURE — 2700000000 HC OXYGEN THERAPY PER DAY

## 2017-10-13 PROCEDURE — 74000 XR ABDOMEN LIMITED (KUB): CPT

## 2017-10-13 PROCEDURE — 84484 ASSAY OF TROPONIN QUANT: CPT

## 2017-10-13 PROCEDURE — 2100000000 HC CCU R&B

## 2017-10-13 PROCEDURE — 84145 PROCALCITONIN (PCT): CPT

## 2017-10-13 PROCEDURE — 84100 ASSAY OF PHOSPHORUS: CPT

## 2017-10-13 PROCEDURE — 87899 AGENT NOS ASSAY W/OPTIC: CPT

## 2017-10-13 PROCEDURE — 36600 WITHDRAWAL OF ARTERIAL BLOOD: CPT

## 2017-10-13 PROCEDURE — 84300 ASSAY OF URINE SODIUM: CPT

## 2017-10-13 PROCEDURE — 36415 COLL VENOUS BLD VENIPUNCTURE: CPT

## 2017-10-13 PROCEDURE — 82330 ASSAY OF CALCIUM: CPT

## 2017-10-13 PROCEDURE — C9113 INJ PANTOPRAZOLE SODIUM, VIA: HCPCS | Performed by: ANESTHESIOLOGY

## 2017-10-13 PROCEDURE — 80048 BASIC METABOLIC PNL TOTAL CA: CPT

## 2017-10-13 PROCEDURE — 82803 BLOOD GASES ANY COMBINATION: CPT

## 2017-10-13 PROCEDURE — 2580000003 HC RX 258: Performed by: ANESTHESIOLOGY

## 2017-10-13 PROCEDURE — 83935 ASSAY OF URINE OSMOLALITY: CPT

## 2017-10-13 PROCEDURE — 82570 ASSAY OF URINE CREATININE: CPT

## 2017-10-13 PROCEDURE — 81001 URINALYSIS AUTO W/SCOPE: CPT

## 2017-10-13 PROCEDURE — A4421 OSTOMY SUPPLY MISC: HCPCS

## 2017-10-13 PROCEDURE — 99291 CRITICAL CARE FIRST HOUR: CPT | Performed by: ANESTHESIOLOGY

## 2017-10-13 PROCEDURE — 87641 MR-STAPH DNA AMP PROBE: CPT

## 2017-10-13 PROCEDURE — 82040 ASSAY OF SERUM ALBUMIN: CPT

## 2017-10-13 PROCEDURE — A6258 TRANSPARENT FILM >16<=48 IN: HCPCS

## 2017-10-13 PROCEDURE — 94640 AIRWAY INHALATION TREATMENT: CPT

## 2017-10-13 PROCEDURE — 76770 US EXAM ABDO BACK WALL COMP: CPT

## 2017-10-13 RX ORDER — BISACODYL 10 MG
10 SUPPOSITORY, RECTAL RECTAL DAILY PRN
Status: DISCONTINUED | OUTPATIENT
Start: 2017-10-13 | End: 2017-10-18 | Stop reason: HOSPADM

## 2017-10-13 RX ORDER — OXYCODONE HYDROCHLORIDE 5 MG/1
5 TABLET ORAL 3 TIMES DAILY
Status: DISCONTINUED | OUTPATIENT
Start: 2017-10-13 | End: 2017-10-13

## 2017-10-13 RX ORDER — SODIUM CHLORIDE 0.9 % (FLUSH) 0.9 %
10 SYRINGE (ML) INJECTION PRN
Status: DISCONTINUED | OUTPATIENT
Start: 2017-10-13 | End: 2017-10-13 | Stop reason: SDUPTHER

## 2017-10-13 RX ORDER — MIRTAZAPINE 15 MG/1
15 TABLET, FILM COATED ORAL NIGHTLY
Status: DISCONTINUED | OUTPATIENT
Start: 2017-10-13 | End: 2017-10-13

## 2017-10-13 RX ORDER — LACTOBACILLUS RHAMNOSUS GG 10B CELL
1 CAPSULE ORAL 2 TIMES DAILY
Status: DISCONTINUED | OUTPATIENT
Start: 2017-10-13 | End: 2017-10-17

## 2017-10-13 RX ORDER — OXYCODONE HYDROCHLORIDE 5 MG/1
5 TABLET ORAL EVERY 4 HOURS PRN
Status: DISCONTINUED | OUTPATIENT
Start: 2017-10-13 | End: 2017-10-17

## 2017-10-13 RX ORDER — DEXTROSE MONOHYDRATE 25 G/50ML
25 INJECTION, SOLUTION INTRAVENOUS ONCE
Status: COMPLETED | OUTPATIENT
Start: 2017-10-13 | End: 2017-10-13

## 2017-10-13 RX ORDER — FUROSEMIDE 10 MG/ML
40 INJECTION INTRAMUSCULAR; INTRAVENOUS ONCE
Status: DISCONTINUED | OUTPATIENT
Start: 2017-10-13 | End: 2017-10-13

## 2017-10-13 RX ORDER — SODIUM POLYSTYRENE SULFONATE 15 G/60ML
30 SUSPENSION ORAL; RECTAL ONCE
Status: COMPLETED | OUTPATIENT
Start: 2017-10-13 | End: 2017-10-13

## 2017-10-13 RX ORDER — IPRATROPIUM BROMIDE AND ALBUTEROL SULFATE 2.5; .5 MG/3ML; MG/3ML
3 SOLUTION RESPIRATORY (INHALATION)
Status: DISCONTINUED | OUTPATIENT
Start: 2017-10-13 | End: 2017-10-17

## 2017-10-13 RX ORDER — TRAZODONE HYDROCHLORIDE 50 MG/1
50 TABLET ORAL NIGHTLY
Status: DISCONTINUED | OUTPATIENT
Start: 2017-10-13 | End: 2017-10-17

## 2017-10-13 RX ORDER — ACETAMINOPHEN 650 MG/1
650 SUPPOSITORY RECTAL EVERY 4 HOURS PRN
Status: DISCONTINUED | OUTPATIENT
Start: 2017-10-13 | End: 2017-10-18 | Stop reason: HOSPADM

## 2017-10-13 RX ORDER — SODIUM CHLORIDE 0.9 % (FLUSH) 0.9 %
10 SYRINGE (ML) INJECTION PRN
Status: DISCONTINUED | OUTPATIENT
Start: 2017-10-13 | End: 2017-10-17

## 2017-10-13 RX ORDER — NITROGLYCERIN 0.4 MG/1
0.4 TABLET SUBLINGUAL EVERY 5 MIN PRN
Status: DISCONTINUED | OUTPATIENT
Start: 2017-10-13 | End: 2017-10-17

## 2017-10-13 RX ORDER — HEPARIN SODIUM 5000 [USP'U]/ML
5000 INJECTION, SOLUTION INTRAVENOUS; SUBCUTANEOUS EVERY 8 HOURS SCHEDULED
Status: DISCONTINUED | OUTPATIENT
Start: 2017-10-13 | End: 2017-10-13

## 2017-10-13 RX ORDER — FOLIC ACID 1 MG/1
1 TABLET ORAL DAILY
Status: DISCONTINUED | OUTPATIENT
Start: 2017-10-13 | End: 2017-10-17

## 2017-10-13 RX ORDER — ASPIRIN 81 MG/1
81 TABLET ORAL DAILY
Status: DISCONTINUED | OUTPATIENT
Start: 2017-10-13 | End: 2017-10-13

## 2017-10-13 RX ORDER — SODIUM CHLORIDE 0.9 % (FLUSH) 0.9 %
10 SYRINGE (ML) INJECTION EVERY 12 HOURS SCHEDULED
Status: DISCONTINUED | OUTPATIENT
Start: 2017-10-13 | End: 2017-10-17

## 2017-10-13 RX ORDER — FERROUS SULFATE 325(65) MG
325 TABLET ORAL
Status: DISCONTINUED | OUTPATIENT
Start: 2017-10-13 | End: 2017-10-17

## 2017-10-13 RX ORDER — VANCOMYCIN HYDROCHLORIDE 1 G/200ML
1000 INJECTION, SOLUTION INTRAVENOUS EVERY 24 HOURS
Status: DISCONTINUED | OUTPATIENT
Start: 2017-10-13 | End: 2017-10-15

## 2017-10-13 RX ORDER — DEXTROSE MONOHYDRATE 25 G/50ML
25 INJECTION, SOLUTION INTRAVENOUS ONCE
Status: DISCONTINUED | OUTPATIENT
Start: 2017-10-13 | End: 2017-10-18 | Stop reason: HOSPADM

## 2017-10-13 RX ORDER — SODIUM CHLORIDE 0.9 % (FLUSH) 0.9 %
10 SYRINGE (ML) INJECTION EVERY 12 HOURS SCHEDULED
Status: DISCONTINUED | OUTPATIENT
Start: 2017-10-13 | End: 2017-10-13 | Stop reason: SDUPTHER

## 2017-10-13 RX ORDER — ACETAMINOPHEN 325 MG/1
650 TABLET ORAL EVERY 4 HOURS PRN
Status: DISCONTINUED | OUTPATIENT
Start: 2017-10-13 | End: 2017-10-13

## 2017-10-13 RX ORDER — BISACODYL 10 MG
10 SUPPOSITORY, RECTAL RECTAL DAILY
Status: DISCONTINUED | OUTPATIENT
Start: 2017-10-13 | End: 2017-10-15

## 2017-10-13 RX ORDER — PANTOPRAZOLE SODIUM 40 MG/10ML
40 INJECTION, POWDER, LYOPHILIZED, FOR SOLUTION INTRAVENOUS DAILY
Status: DISCONTINUED | OUTPATIENT
Start: 2017-10-13 | End: 2017-10-15

## 2017-10-13 RX ORDER — CLOPIDOGREL BISULFATE 75 MG/1
75 TABLET ORAL DAILY
Status: DISCONTINUED | OUTPATIENT
Start: 2017-10-13 | End: 2017-10-13

## 2017-10-13 RX ORDER — PANTOPRAZOLE SODIUM 40 MG/1
40 TABLET, DELAYED RELEASE ORAL
Status: DISCONTINUED | OUTPATIENT
Start: 2017-10-13 | End: 2017-10-13

## 2017-10-13 RX ORDER — CLOPIDOGREL 300 MG/1
300 TABLET, FILM COATED ORAL ONCE
Status: DISCONTINUED | OUTPATIENT
Start: 2017-10-13 | End: 2017-10-13

## 2017-10-13 RX ORDER — LEVOTHYROXINE SODIUM 0.1 MG/1
100 TABLET ORAL DAILY
Status: DISCONTINUED | OUTPATIENT
Start: 2017-10-13 | End: 2017-10-17

## 2017-10-13 RX ORDER — ONDANSETRON 2 MG/ML
4 INJECTION INTRAMUSCULAR; INTRAVENOUS EVERY 6 HOURS PRN
Status: DISCONTINUED | OUTPATIENT
Start: 2017-10-13 | End: 2017-10-18 | Stop reason: HOSPADM

## 2017-10-13 RX ORDER — TAMSULOSIN HYDROCHLORIDE 0.4 MG/1
0.4 CAPSULE ORAL DAILY
Status: DISCONTINUED | OUTPATIENT
Start: 2017-10-13 | End: 2017-10-13

## 2017-10-13 RX ORDER — DEXTROSE MONOHYDRATE 25 G/50ML
12.5 INJECTION, SOLUTION INTRAVENOUS PRN
Status: DISCONTINUED | OUTPATIENT
Start: 2017-10-13 | End: 2017-10-17

## 2017-10-13 RX ORDER — MIRTAZAPINE 15 MG/1
15 TABLET, FILM COATED ORAL NIGHTLY
Status: DISCONTINUED | OUTPATIENT
Start: 2017-10-13 | End: 2017-10-13 | Stop reason: SDUPTHER

## 2017-10-13 RX ORDER — ONDANSETRON 2 MG/ML
4 INJECTION INTRAMUSCULAR; INTRAVENOUS EVERY 6 HOURS PRN
Status: DISCONTINUED | OUTPATIENT
Start: 2017-10-13 | End: 2017-10-13 | Stop reason: SDUPTHER

## 2017-10-13 RX ORDER — DEXTROSE MONOHYDRATE 50 MG/ML
100 INJECTION, SOLUTION INTRAVENOUS PRN
Status: DISCONTINUED | OUTPATIENT
Start: 2017-10-13 | End: 2017-10-17

## 2017-10-13 RX ORDER — FINASTERIDE 5 MG/1
5 TABLET, FILM COATED ORAL DAILY
Status: DISCONTINUED | OUTPATIENT
Start: 2017-10-13 | End: 2017-10-17

## 2017-10-13 RX ORDER — LORAZEPAM 1 MG/1
1 TABLET ORAL EVERY 6 HOURS PRN
Status: DISCONTINUED | OUTPATIENT
Start: 2017-10-13 | End: 2017-10-17

## 2017-10-13 RX ORDER — CETIRIZINE HYDROCHLORIDE 10 MG/1
10 TABLET ORAL DAILY
Status: DISCONTINUED | OUTPATIENT
Start: 2017-10-13 | End: 2017-10-16

## 2017-10-13 RX ORDER — SODIUM CHLORIDE 9 MG/ML
INJECTION, SOLUTION INTRAVENOUS CONTINUOUS
Status: DISCONTINUED | OUTPATIENT
Start: 2017-10-13 | End: 2017-10-15

## 2017-10-13 RX ORDER — POTASSIUM CHLORIDE 20 MEQ/1
40 TABLET, EXTENDED RELEASE ORAL DAILY
Status: DISCONTINUED | OUTPATIENT
Start: 2017-10-13 | End: 2017-10-13

## 2017-10-13 RX ORDER — NICOTINE POLACRILEX 4 MG
15 LOZENGE BUCCAL PRN
Status: DISCONTINUED | OUTPATIENT
Start: 2017-10-13 | End: 2017-10-17

## 2017-10-13 RX ADMIN — PIPERACILLIN SODIUM,TAZOBACTAM SODIUM 3.38 G: 3; .375 INJECTION, POWDER, FOR SOLUTION INTRAVENOUS at 14:33

## 2017-10-13 RX ADMIN — LEVOTHYROXINE SODIUM 100 MCG: 100 TABLET ORAL at 11:53

## 2017-10-13 RX ADMIN — IPRATROPIUM BROMIDE AND ALBUTEROL SULFATE 3 ML: .5; 3 SOLUTION RESPIRATORY (INHALATION) at 16:27

## 2017-10-13 RX ADMIN — Medication 10 ML: at 22:01

## 2017-10-13 RX ADMIN — IPRATROPIUM BROMIDE AND ALBUTEROL SULFATE 3 ML: .5; 3 SOLUTION RESPIRATORY (INHALATION) at 08:08

## 2017-10-13 RX ADMIN — PANTOPRAZOLE SODIUM 40 MG: 40 INJECTION, POWDER, FOR SOLUTION INTRAVENOUS at 12:38

## 2017-10-13 RX ADMIN — TAZOBACTAM SODIUM AND PIPERACILLIN SODIUM 3.38 G: 375; 3 INJECTION, SOLUTION INTRAVENOUS at 23:03

## 2017-10-13 RX ADMIN — CETIRIZINE HYDROCHLORIDE 10 MG: 10 TABLET, FILM COATED ORAL at 11:54

## 2017-10-13 RX ADMIN — VANCOMYCIN HYDROCHLORIDE 1000 MG: 1 INJECTION, SOLUTION INTRAVENOUS at 23:03

## 2017-10-13 RX ADMIN — IPRATROPIUM BROMIDE AND ALBUTEROL SULFATE 3 ML: .5; 3 SOLUTION RESPIRATORY (INHALATION) at 20:43

## 2017-10-13 RX ADMIN — Medication 1 CAPSULE: at 11:53

## 2017-10-13 RX ADMIN — PIPERACILLIN SODIUM,TAZOBACTAM SODIUM 3.38 G: 3; .375 INJECTION, POWDER, FOR SOLUTION INTRAVENOUS at 08:29

## 2017-10-13 RX ADMIN — Medication 10 ML: at 12:42

## 2017-10-13 RX ADMIN — TRAZODONE HYDROCHLORIDE 50 MG: 50 TABLET ORAL at 22:02

## 2017-10-13 RX ADMIN — Medication 325 MG: at 11:53

## 2017-10-13 RX ADMIN — SODIUM CHLORIDE: 9 INJECTION, SOLUTION INTRAVENOUS at 06:41

## 2017-10-13 RX ADMIN — FOLIC ACID 1 MG: 1 TABLET ORAL at 11:53

## 2017-10-13 RX ADMIN — SODIUM POLYSTYRENE SULFONATE 15 G: 15 SUSPENSION ORAL; RECTAL at 12:29

## 2017-10-13 RX ADMIN — CALCIUM GLUCONATE 2 G: 94 INJECTION, SOLUTION INTRAVENOUS at 06:39

## 2017-10-13 RX ADMIN — DEXTROSE MONOHYDRATE 25 G: 25 INJECTION, SOLUTION INTRAVENOUS at 07:37

## 2017-10-13 RX ADMIN — Medication 1 CAPSULE: at 22:02

## 2017-10-13 RX ADMIN — IPRATROPIUM BROMIDE AND ALBUTEROL SULFATE 3 ML: .5; 3 SOLUTION RESPIRATORY (INHALATION) at 12:42

## 2017-10-13 RX ADMIN — INSULIN HUMAN 10 UNITS: 100 INJECTION, SOLUTION PARENTERAL at 07:38

## 2017-10-13 RX ADMIN — FINASTERIDE 5 MG: 5 TABLET, FILM COATED ORAL at 11:53

## 2017-10-13 ASSESSMENT — PAIN SCALES - WONG BAKER
WONGBAKER_NUMERICALRESPONSE: 6
WONGBAKER_NUMERICALRESPONSE: 0
WONGBAKER_NUMERICALRESPONSE: 6
WONGBAKER_NUMERICALRESPONSE: 0
WONGBAKER_NUMERICALRESPONSE: 6
WONGBAKER_NUMERICALRESPONSE: 6
WONGBAKER_NUMERICALRESPONSE: 0
WONGBAKER_NUMERICALRESPONSE: 6

## 2017-10-13 NOTE — ED NOTES
Spoke with Dr. Keyur Ott about pt's elevated rectal temp. Due to pt's hx of GI bleed he stated he did not want to give him any motrin, and since pt has an allergy to tylenol, Dr. Keyur Ott stated pt was not going to receive anything to lower his temp at this time.      Elva Cerrato RN  10/13/17 5877

## 2017-10-13 NOTE — PLAN OF CARE
Problem: Skin Integrity - Impaired  Goal: Wound size and drainage will decrease and surrounding tissue/skin remains healthy and intact  Outcome: Ongoing  Consulted to see pt today for multiple wounds. Upon entry to room, primary nurse Suzie Boxer RN in room, pt noted to be on a [facility] bed. Pt noted to have a right AKA. Primary nurse stated pt has redness to buttocks that was visible with lifting the patient's leg. Noted to have redness, tenderness, denuded skin. Recommend to continue EPC ointment to area and continue with blue chux. Assisted primary nurse to re-wrap right stump. There is an intact incision line to the distal end of the stump and anterior upper thigh from a stent placement. Primary nurse wrapped right stump with ACE wrap. Denies further needs at this time. Will sign off. Staff to call if needed. Comments: Care plan reviewed with patient and RN. Patient and RN verbalize understanding of the plan of care and contribute to goal setting.

## 2017-10-13 NOTE — CONSULTS
[facility]    Patient - Carleene Apley   MRN -  [id number]   [facility]   - 1940      Date of Admission -  10/12/2017 10:02 PM  Date of evaluation -  10/13/2017  [id number] Rainey MD  Primary Care Physician - Yoli Grigsby,  [address] Day - [address]  Chief Complaint/Reason for consult   Sepsis, respiratory distress and [address] Problems    Diagnosis Date Noted    Mental confusion [R41.0] 2017     Priority: High     Class: Acute    Late onset Alzheimer's disease with behavioral disturbance  and mental confusion [G30.1, F02.81] 2017     Priority: High     Class: Acute    Obstructive sleep apnea treated with BiPAP [G47.33] 2015     Priority: High    Sepsis ([facility] [address] [A41.9] 10/13/2017    Acute respiratory distress [R06.03] 10/13/2017    Hematemesis [K92.0] 10/13/2017    Blood poisoning ([facility] [address] [A41.9]     Dyspnea and respiratory abnormalities [R06.00, R06.89]     Unable to ambulate [R26.2] 2016    Dysphagia [R13.10] 2016    GERD (gastroesophageal reflux disease) [K21.9] 10/09/2012     HPI   The patient is a 76 y.o. male with significant past medical history of multiple CVA most recent in , R AKA approximately 8 weeks ago s/p arterial thrombosis with ischemia, COPD, CINTHIA, Dementia, Alzheimer's, GERD, Thyroid disease, BPH, and suprapubic catheter in the past. Patient was transferred from nursing home on 10/12/17 for altered mental status, tachypnea and hypoxia SpO2 in 70's. Patient is reported to been having coffee ground emesis on 10/12/17 that was being treated by family physician at nursing home but his condition deteriorated with worsening of tachypnea and becoming tachycardic and EMS was called. Patient became hypotensive with BP in 80's was given liter bolus at nursing home. Another liter was given during transport with EMS.    Past 148  Respiration Range:Resp  Av.1  Min: 38  Max: 50  Current Pulse Ox:  SpO2: 98 %  24HR Pulse Ox Range:SpO2  Av.9 %  Min: 86 %  Max: 100 %  I/O      Intake/Output Summary (Last 24 hours) at 10/13/17 1120  Last data filed at 10/13/17 0426   Gross per 24 hour   Intake               20 ml   Output               35 ml   Net              -15 ml     I/O last 3 completed shifts: In: 20 [I.V.:20]  Out: 35 [Urine:35]     Date 10/13/17 0000 - 10/13/17 2359   Shift 5398-0633 1549-3676 5205-5965 24 Hour Total   I  N  T  A  K  E   I.V.  (mL/kg/hr) 20  (0)   20    Shift Total  (mL/kg) 20  (0.3)   20  (0.3)   O  U  T  P  U  T   Urine  (mL/kg/hr) 35  (0.1)   35    Shift Total  (mL/kg) 35  (0.5)   35  (0.5)   Weight (kg) 69.1 69.1 69.1 69.1     Patient Vitals for the past 96 hrs (Last 3 readings):   Weight   10/13/17 0438 152 lb 5.4 oz (69.1 kg)   10/13/17 0114 190 lb (86.2 kg)     Lines, ET tube, Devices, Drains   PIV, Conte  Exam   Physical Exam   Constitutional: No distress on CPAP per facemask. Patient appears ill. Head: Normocephalic and atraumatic. Mouth/Throat: Oropharynx is clear and moist.  No oral thrush. Eyes: Conjunctivae are normal. Pupils are equal, round, and reactive to light. No scleral icterus. Neck: Neck supple. No tracheal deviation present. Cardiovascular: Regular rate, regular rhythm, S1 and S2 with no murmur. No peripheral edema. No JVD  Pulmonary/Chest: Normal effort with bilateral air entry noted clear breath sounds, diminished bibasilar. No stridor. Tachypnic. Patient exhibits no tenderness. Abdominal: Soft. Bowel sounds audible. No distension or tenderness to palp. Musculoskeletal: Moves all extremities. RLE AKA stump intact. Neurological: Patient is alert and disoriented, non-communicative, not following commands. Skin: Cool extremities and dry.      Labs   ABG  Lab Results   Component Value Date    PH 7.41 10/13/2017    PO2 90 10/13/2017    PCO2 42 10/13/2017    HCO3 27 10/13/2017    O2SAT 97 10/13/2017     Lab Results   Component Value Date    IFIO2 50 10/13/2017    SETPEEP 12.0 10/13/2017     CBC  Recent Labs      10/12/17   2225  10/13/17   0603   WBC  28.8*  24.4*   RBC  4.30*  4.18*   HGB  12.4*  12.0*   HCT  39.1*  37.8*   MCV  91.0  90.5   MCH  28.9  28.8   MCHC  31.7*  31.8*   RDW  17.9*  18.2*   PLT  374  301   MPV  8.4  8.4      BMP  Recent Labs      10/12/17   2225  10/13/17   0510   NA  138  139   K  5.9*  6.6*   CL  99  99   CO2  25  21*   BUN  24*  29*   CREATININE  1.2  1.8*   GLUCOSE  156*  142*   MG  2.0  2.0   PHOS  4.7  4.5   CALCIUM  8.5  8.3*     LFT  Recent Labs      10/12/17   2225   AST  18   ALT  15   BILITOT  0.6   ALKPHOS  118   LIPASE  11.6     TROP  Lab Results   Component Value Date    TROPONINT 0.030 10/13/2017    TROPONINT 0.023 10/12/2017    TROPONINT < 0.010 08/23/2017     BNP  No results for input(s): BNP in the last 72 hours. Lactic Acid  Recent Labs      10/13/17   0510  10/13/17   0603  10/13/17   0812   LACTA  2.7*  3.4*  3.4*     INR  Recent Labs      10/12/17   2225  10/13/17   0510   INR  1.16*  1.35*     PTT  Recent Labs      10/12/17   2225   APTT  30.0     Glucose  No results for input(s): POCGLU in the last 72 hours. UA Recent Labs      10/12/17   2216  10/13/17   0500   SPECGRAV   --   >1.030*   PHUR  8.0  5.0   COLORU  DK YELLOW*  DARK BROWN   PROTEINU  100*  100*   BLOODU  LARGE*  LARGE*   RBCUA  3-5  15-20   WBCUA  10-15     BACTERIA  MANY  MODERATE   NITRU  POSITIVE*  POSITIVE*   GLUCOSEU  NEGATIVE   --    BILIRUBINUR  SMALL*  MODERATE*   UROBILINOGEN  0.2  0.2   KETUA  NEGATIVE  TRACE*   LABCAST   --   0-4 HYALINE   .   EKG   10/12/17  Sinus tachycardia with Fusion complexes  Right superior axis deviation  Pulmonary disease pattern  Cannot rule out Inferior infarct , age undetermined  Abnormal ECG  When compared with ECG of 23-AUG-2017 19:19,  Significant changes have occurred    Echo   07/03/17  Summary   Technically fluid bolus prior to arriving at ED    Gastrointestinal    NG tube. Coffee ground emesis   Peptic ulcer disease prophylaxis with Protonix IV. Renal   IVF NS at 100 mL/ hr  Carefully monitor input and output. Supplement all electrolytes per ICU electrolytes replacement protocols. Urosepsis  UA- moderate- bili, trace-ketones, Specific grav. >1.030, large blood, protein 100, Positive nitrate, moderate leuko esterase. Infectious Disease   UTI   Possible pneumonia-CT chest infiltrates  Sepsis  Hematology/Oncology   Leukopenia-trending down 28.8-24.4  DVT prophylaxis-SCD L LE only RAKA  Endocrine   Hypothyroid  On synthroid   Ordered TSH  Social/Spiritual/DNR/Miscellaneous   Bedrest.  Condition critical.  Vitals signs monitoring per protocol. Palliative Care consult- to discuss code status and goals of treatment    Thank you for the consult and allowing us to participate in the care of your patient. Case discussed with nurse, Dr. Malik Raines and patient. Questions and concerns addressed. Meds and Orders reviewed. Electronically signed by     Nilda Mills CNP on 10/13/2017 at 11:20 AM    Addendum by Dr. Malik Raines MD:  I have seen and examined the patient independently. Face to face evaluation and examination was performed. The above evaluation and note has been reviewed. Labs and radiographs were reviewed. I Have discussed with Mr. Erick Vallejo CNP about this patient in detail. D/w Patient's R.N. and specific instructions given. Patient issues addressed. The above assessment and plan has been reviewed. Please see my modifications mentioned below. My modifications:  S  : He is O2 via nasal cannula at the time of my exam.  NG tube aspirate is clear. No coffee ground aspirate noted. O  : Good air entry present on both sides. Decreased breath sounds at bases. A  : Bilateral pneumonia due to CAP Vs HCAP Vs aspiration pneumonia  UTI with proteus. Metabolic encephalopathy.   P  : Continue current antibiotics. Follow pending cultures. Speech path eval for swallow test.  Start on tube feeds. Dietician consult. Limited code status. Antibiotics per ID service. CINTHIA on BiPAP 11/7cm H20. He follows with Ms Scarlett Presley. Chest Physiotherapy with Vest as tolerated. -Discussed with Mr.Ken MOELLER taking care of patient regarding patient condition and my management plan at bed side.  -Discussed with patient family including his daughter and son and explained in detail about current patient condition, clinical status and prognosis. All questions and concers were answered.     Ira Mcdonnell MD 10/13/2017 3:05 PM

## 2017-10-13 NOTE — PLAN OF CARE
Problem: Discharge Planning:  Goal: Discharged to appropriate level of care  Discharged to appropriate level of care  Outcome: Ongoing      Problem: Gas Exchange - Impaired:  Goal: Levels of oxygenation will improve  Levels of oxygenation will improve  Requires minimal O2. Problem: Infection, Septic Shock:  Goal: Will show no infection signs and symptoms  Will show no infection signs and symptoms  Outcome: Ongoing  His fever has improved today.

## 2017-10-13 NOTE — ED NOTES
Pt resting in bed, wife bedside. Wife states pt has no needs at this time.       Garrick Clay RN  10/13/17 2122

## 2017-10-13 NOTE — PROGRESS NOTES
Lengthy discussion with wife, son, daughter-in-law, daughter and son-in-law regarding patient's current condition and struggles that he is facing. Discussed the limitations of code status and the importance of taking it \"a day at a time\" to see if patient improves. Daughter and son express support of this decision. Discussed comfort care measures and when it is appropriate to consider this. Daughter seems unrealistic at times and keeps referring to how her dad was doing in June and \"if we can just get him back to that point. \"  Discussed how the events in between June and now have played a toll on the patient and that this may not be realistic. Wife states that the patient's oral intake has been poor at the [facility] and states that he is on thickened liquids. Discussed the impacts that dementia and illness can play on swallowing. Discussed decision of eventual PEG tube discussion and what this entails. Discussed aspiration pneumonia and the risks that are still present even with appropriate liquid thickening and/or NG tube/PEG tube. Wife, Josias Pritchard expresses several times that the patient has not been happy and states that she has been thinking about his quality of life. Son asks about his 3 siblings out of state and encouraged him to update them and \"facetime\" them so that they may make the appropriate decision for themselves as to whether or not to come to see the patient. Much emotional support given. Updated primary RN, Zia Fonseca. Spiritual care consulted for family support.

## 2017-10-13 NOTE — PLAN OF CARE
Problem: RESPIRATORY  Goal: Normal spontaneous ventilation  Outcome: Ongoing  Placed on cpap to help with work of breathing

## 2017-10-13 NOTE — PROGRESS NOTES
The transport originated from ED. Pt. was transported to CCU. Assisting with the transport was Merlyn MOELLER. A defibrillator was brought along on transport. Appropriate devices were applied to monitor the patient's condition during transport. A transport backpack was brought on transport, to be used in case of emergency. Patient transported  via 60% O2 via cpap. Patient tolerated procedure well.

## 2017-10-13 NOTE — PROGRESS NOTES
Nutrition Assessment    Type and Reason for Visit: Initial, Consult (TF consult)    Nutrition Recommendations: Plan Nepro TF with goal of 40 ml/hr. Free H20 flush per Dr. Alyssa Wilder following for swallow evaluation & diet as able. Malnutrition Assessment:  · Malnutrition Status: Meets the criteria for severe malnutrition  · Context: Acute illness or injury  · Findings of the 6 clinical characteristics of malnutrition (Minimum of 2 out of 6 clinical characteristics is required to make the diagnosis of moderate or severe Protein Calorie Malnutrition based on AND/ASPEN Guidelines):  1. Energy Intake-Less than or equal to 50%, greater than or equal to 5 days    2. Weight Loss- (15% wt loss in 3 months not counting AKA),    3. Muscle Loss-Severe muscle mass loss, Temples (temporalis muscle)  Nutrition Diagnosis:   · Problem: Severe malnutrition  · Etiology: related to Catabolic illness     Signs and symptoms:  as evidenced by Severe muscle loss, Diet history of poor intake (15% wt loss in 3 months)    Nutrition Assessment:  · Subjective Assessment: Per pt daughter pt had stroke ~5 years ago, does have hx of dementia, but in July had clot in abdomen & with recurrent UTI & then ended up needing AKA in September this year. Pt with significant wt loss since July excluding AKA. Per daughter pt was on pureed nectar thick diet at [facility] & they feel that pt may have aspirated. Note pt reported to have coffee grd emesis on 10/12/17 at [facility]. KUB notes mild ileus, ? constipation. Pt seen with NG in confused & Gideon Tolentino RN attending to pt. Pt not on Bipap or Cpap at time of visit. Glucose 142, K+ 6.6, BUN 29, Cr 1.8. Will use Nepro TF which is low in K+.   SLP has been consulted for swallow  · Wound Type:  (10/13 per Gideon Tolentino RN pt with some excoriation around coccyx & stump from September's is healing well. )  · Current Nutrition Therapies:  · Oral Diet Orders: NPO   · Tube Feeding (TF) Orders:   · Feeding Route: Nasogastric  · Formula: Renal (Nepro)  · Rate (ml/hr):start at 20 ml/hr & goal is 40 ml/hr    · Volume (ml/day): 960 ml  · Duration: Continuous 24hrs  · Water Flushes:  (per Dr)  · Goal TF & Flush Orders Provides: 1728 kcals, 78 grams protein & 155 grams CHO/24 hours  · Additional Calories:    · Anthropometric Measures:  · Ht: 5' 4\" (162.6 cm)   · Current Body Wt: 152 lb 5.4 oz (69.1 kg)  · Admission Body Wt: 152 lb 5.4 oz (69.1 kg) (10/13)  · Usual Body Wt:  (7/15/17 pt weighed 205#  & had AKA in  September  & continued to lose wt.  pt weighed ~180-190# afer AKA with speaking to daughter)  · % Weight Change: 15% in 3 months not counting AKA,     · Ideal Body Wt: 120 lb (54.4 kg) (adjusted for AKA),   · BMI Classification:  (BMI is 28.8 adjusted for AKA)  · Comparative Standards (Estimated Nutrition Needs):  · Estimated Daily Total Kcal: ~1727 kcals ( 25 kcals/kg on admit wt of 69.1 kg)  · Estimated Daily Protein (g): ~70 grams ( 1 gram protein/kg on admit wt of 69.1 kg)  Estimated Intake vs Estimated Needs: Intake Less Than Needs    Nutrition Risk Level: High    Nutrition Interventions:   Start Tube Feeding (Swallow evaluation pending)  Continued Inpatient Monitoring, Education not appropriate at this time    Nutrition Evaluation:   · Evaluation: Goals set   · Goals: TF to provide 80% of nutrient needs until pt able to transitition to adequate oral intake   · Monitoring: NPO Status, Diet Progression, TF Intake, TF Tolerance, Skin Integrity, Wound Healing, Weight, Pertinent Labs    See Adult Nutrition Doc Flowsheet for more detail.      Electronically signed by Rivera Ivy RD, LD on 10/13/17 at 4:10 PM    Contact Number: [phone]

## 2017-10-13 NOTE — PROGRESS NOTES
Pharmacy Note  Vancomycin Consult    Luis Armando Madsen is a 76 y.o. male started on Vancomycin for sepsis; consult received from Dr. Ada Hall to manage therapy. Also receiving the following antibiotics: Zosyn. Patient Active Problem List   Diagnosis    Depression    GERD (gastroesophageal reflux disease)    Cerebrovascular accident, hemorrhagic ([address]    Multiple fractures of ribs of right side    Obesity (BMI 30-39. 9)    Obstructive sleep apnea treated with BiPAP    Back pain    Fall    Unable to ambulate    Dysphagia    Acute midline low back pain without sciatica    Inability to walk    Mental confusion    Late onset Alzheimer's disease with behavioral disturbance  and mental confusion    Chest wall pain  left lower chest    Dyspnea and respiratory abnormalities       Allergies:  Acetaminophen; Donepezil hcl; and Namenda [memantine hcl]     Temp max: 103    Recent Labs      10/12/17   2225   BUN  24*       Recent Labs      10/12/17   2225   CREATININE  1.2       Recent Labs      10/12/17   2225   WBC  28.8*         Intake/Output Summary (Last 24 hours) at 10/13/17 0549  Last data filed at 10/13/17 0426   Gross per 24 hour   Intake 20 ml   Output 35 ml   Net -15 ml     Culture Date Source Results   10/12/17 [facility]    10/12/17 [facility]    10/12/17 [facility]        Ht Readings from Last 1 Encounters:   10/13/17 5' 4\" (1.626 m)        Wt Readings from Last 1 Encounters:   10/13/17 152 lb 5.4 oz (69.1 kg)         Body mass index is 26.15 kg/m². CrCl: 42 mL/min      Assessment/Plan:  Patient received an initial dose of vancomycin 1000 mg iv x1 dose in ED. Will continue with vancomycin 1000 mg IV every 24 hours. Timing of trough level will be determined based on culture results, renal function, and clinical response. Thank you for the consult. Will continue to follow.   Rufina Johnson  10/13/2017   5:52 AM

## 2017-10-13 NOTE — ED PROVIDER NOTES
[facility]  eMERGENCY dEPARTMENT eNCOUnter          [address] [facility]       Chief Complaint   Patient presents with    Altered Mental Status    Emesis     coffee ground       Nurses Notes reviewed and I agree except as noted in the HPI. HISTORY OF PRESENT ILLNESS    Rosmery Sawant is a 76 y.o. male. Patient presents today from [facility] due to increased confusion, combativeness, and coffee ground emesis. Patient has been tachycardic per EMS. EMS also reports that the patient was hypotensive at the [facility] and a liter of bolus was given. Patient is also a right lower extremity amputee, this was a recent surgery for right lower extremity ischemia. Patient is a DNR-CCA. The HPI is limited due to the patient's condition. REVIEW OF SYSTEMS         ROS is unable to be completely performed due to dementia      PAST MEDICAL HISTORY    has a past medical history of Alzheimer disease; Cerebral artery occlusion with cerebral infarction [facility]); COPD (chronic obstructive pulmonary disease) ([facility] CVA (cerebral infarction); Dementia; Foot drop, left; GERD (gastroesophageal reflux disease); Sleep apnea; Thyroid disease; and Ulcer ([facility] SURGICAL HISTORY      has a past surgical history that includes back surgery; Appendectomy; Colonoscopy; and Endoscopy, colon, diagnostic.     CURRENT MEDICATIONS       Previous Medications    BISACODYL (DULCOLAX) 10 MG SUPPOSITORY    Place 10 mg rectally daily as needed for Constipation    FERROUS SULFATE 325 (65 FE) MG TABLET    Take 325 mg by mouth daily (with breakfast)    FINASTERIDE (PROSCAR) 5 MG TABLET    Take 5 mg by mouth daily    FOLIC ACID (FOLVITE) 1 MG TABLET    Take 1 mg by mouth daily    IBUPROFEN (ADVIL;MOTRIN) 200 MG TABLET    Take 200 mg by mouth every 6 hours as needed for Pain    IPRATROPIUM-ALBUTEROL (DUONEB) 0.5-2.5 (3) MG/3ML SOLN NEBULIZER SOLUTION    Inhale 3 mLs into the lungs every 4 hours (while awake)    LACTOBACILLUS (CULTURELLE) CAPSULE    Take 1 capsule by mouth 2 times daily    LEVOTHYROXINE (SYNTHROID) 100 MCG TABLET    Take 100 mcg by mouth Daily    LORATADINE (CLARITIN) 10 MG TABLET    Take 10 mg by mouth daily    LORAZEPAM (ATIVAN) 1 MG TABLET    Take 1 mg by mouth every 6 hours as needed for Anxiety    MIRTAZAPINE (REMERON) 15 MG TABLET    Take 1 tablet by mouth nightly    MULTIPLE VITAMINS-MINERALS (CERTAGEN PO)    Take 1 tablet by mouth daily     NITROGLYCERIN (NITROSTAT) 0.4 MG SL TABLET    Place 1 tablet under the tongue every 5 minutes as needed for Chest pain    OMEPRAZOLE (PRILOSEC) 20 MG DELAYED RELEASE CAPSULE    Take 20 mg by mouth nightly    OXYCODONE (ROXICODONE) 5 MG IMMEDIATE RELEASE TABLET    Take 5 mg by mouth 3 times daily . OXYCODONE (ROXICODONE) 5 MG IMMEDIATE RELEASE TABLET    Take 5 mg by mouth every 4 hours as needed for Pain . POTASSIUM CHLORIDE (KLOR-CON M) 20 MEQ EXTENDED RELEASE TABLET    Take 40 mEq by mouth daily    TAMSULOSIN (FLOMAX) 0.4 MG CAPSULE    Take 1 capsule by mouth daily    TRAZODONE (DESYREL) 50 MG TABLET    Take 50 mg by mouth nightly    WARFARIN (COUMADIN) 1 MG TABLET    Take 0.5 mg by mouth daily       ALLERGIES     is allergic to acetaminophen; donepezil hcl; and namenda [memantine hcl]. FAMILY HISTORY     indicated that the status of his mother is unknown.    family history includes Other in his mother. SOCIAL HISTORY      reports that he quit smoking about 8 years ago. His smoking use included Cigarettes. He smoked 1.50 packs per day. He has never used smokeless tobacco. He reports that he does not drink alcohol or use drugs. PHYSICAL EXAM     INITIAL VITALS:  height is 5' 4\" (1.626 m) and weight is 190 lb (86.2 kg). His rectal temperature is 102.9 °F (39.4 °C). His blood pressure is 107/92 (abnormal) and his pulse is 134. His respiration is 46 (abnormal) and oxygen saturation is 98%.     Constitutional:  Acutely ill-appearing  Eyes:  Pupils are equal and has received IV antibiotics. We gave him initial fluid bolus for holding the rest due to evidence of edema on his chest x-ray. I discussed case with the hospitalist to arrange for admission    CRITICAL CARE:   CRITICAL CARE: There was a high probability of clinically significant/life threatening deterioration in this patient's condition which required my urgent intervention. Total critical care time was 30 minutes. This excludes any time for separately reportable procedures. CONSULTS:  Dr Opal Harris:  None    FINAL IMPRESSION      1. Sepsis, due to unspecified organism [facility])          DISPOSITION/PLAN   Admitted    DISCHARGE MEDICATIONS:  New Prescriptions    No medications on file       (Please note that portions of this note were completed with a voice recognition program.  Efforts were made to edit the dictations but occasionally words are mis-transcribed.)    Scribe:  Bruce Soares 10/12/17 11:23 PM Scribing for and in the presence of Rosemarie Puri DO. Signed by: Abhi Piper. 10/12/17 1:41 AM    Provider:  I personally performed the services described in the documentation, reviewed and edited the documentation which was dictated to the scribe in my presence, and it accurately records my words and actions.     Rosemarie Puri DO 10/12/17 1:41 AM       Rosemarie Puri DO  10/14/17 1027

## 2017-10-13 NOTE — ED NOTES
Nora Lefort with respiratory at bedside to evaluate gag reflex. Pt would not allow yaunker into his mouth and immediately clamped down onto yaunker. Dr. Jessica Diaz was at bedside and witnessed pt's positive response.      Naveen Coronado RN  10/13/17 9258

## 2017-10-13 NOTE — ED NOTES
Called and spoke to Sean at [facility] who provided me with more info regarding pt.      Cecelia Stevens RN  10/13/17 9167

## 2017-10-13 NOTE — ED NOTES
Fax received from Sean at [facility] stating pt's last known code status was [facility]. Dr. Mariusz Delgado notified.      Matthew Mayfield RN  10/13/17 1393

## 2017-10-13 NOTE — PROGRESS NOTES
years ago, foot drop on the left  COPD  GERD  Sleep apnea  Hypothyroidism  Benign prostate hypertrophy- history of suprapubic catheter

## 2017-10-13 NOTE — CONSULTS
[address] [place] [address]                                 CONSULTATION    PATIENT NAME: Tita Boles                  :             1940  MED REC NO:   [id number]                            ROOM:            0001  ACCOUNT NO:   [de-identified]                            ADMISSION DATE:  10/12/2017  PROVIDER:    ALIE Connolly DATE:  10/13/2017    CONSULT TO:  ALIE Villavicencio REQUESTED FOR:  Sepsis. HISTORY OF PRESENT ILLNESS:  The patient is a nice 47-year-old male  with a history of stroke and also problems with dementia, Alzheimer's  disease, GERD, BPH, and history of suprapubic catheter in the past and  also significant problems with the patient undergoing right lower  extremity AKA amputation done. The patient had this amputation done 8  weeks ago and prior to this admission on 10/13/2017. The patient had  been noted to be hypoxemic and had been placed on BiPAP. Noted to be  also hypotensive and hypoxic. Infectious Disease consultation requested given his past history of C.  difficile and overall clinical deconditioning and sepsis and the  problems including respiratory distress and respiratory failure. At  this point, the patient is not being intubated given the patient's  family decisions as per my discussion with Dr. Arpit Clinton. PAST MEDICAL HISTORY:  Significant for peripheral vascular disease,  right AKA, history of sleep apnea, left footdrop, dementia, CVA, COPD,  Alzheimer's disease, cerebral artery occlusion. PAST SURGICAL HISTORY:  Colonoscopy and appendectomy. FAMILY HISTORY:  Otherwise noncontributory. PERSONAL HISTORY:  Former smoker. Not an alcoholic. Not a drug  abuser. REVIEW OF SYSTEMS:  Significant for above-said problems with his  respiratory distress and oral clinical deconditioning. No diarrhea.    Shortness of breath positive and fever history positive. So, review  of systems is not elicitable. CURRENT MEDICATIONS:  On reviewing the patient's medications  currently, the patient is on Zyrtec, Proscar, folic acid, DuoNeb,  Culturelle, Levaquin, Synthroid, Protonix, Zosyn, Kayexalate, Desyrel,  and vancomycin. PHYSICAL EXAMINATION:  VITAL SIGNS:  Blood pressure noted to be 87/72, pulse rate is 117, and  temperature is 100.8 with T-max of 102.9 degrees Fahrenheit at  admission. HEENT:  Head is atraumatic and normocephalic. Pupils are equal and  reactive to light. The patient is moaning and groaning on BiPAP. NECK:  Supple. HEART:  S1 plus. S2 plus. LUNGS:  Air entry positive with significant reduced air entry, mild  crackles. ABDOMEN:  Soft, nontender. Bowel sounds positive. EXTREMITIES:  Significant for the patient having right AKA amputation  done. CNS:  Significant for lethargy and weakness. Examination of the  Conte, the patient is noted to have cloudy urine. LABORATORY DATA:  Reviewing the patient's labs, WBC 24,000, hemoglobin  12, hematocrit 37, and platelets are 057. The patient's urinalysis  showing large blood and nitrite positive and BUN is 29, creatinine is  1.8, potassium is 6.6. Chest x-ray is noted to show patchy infiltrates in the lower lobe  suggesting pneumonia on CT scan. DIAGNOSTIC IMPRESSION:  1. Severe sepsis with shock. 2.  Aspiration pneumonia. 3.  Acute respiratory failure. 4.  UTI. 5.  History of prostate problems with BPH. 6.  Peripheral vascular disease, status post AKA done 2 months prior  to this hospitalization. 7.  Dementia. 8.  Acute kidney injury. 9.  COPD. 10.  History of C. difficile in the past per the patient's family. PLAN:  Continue Zosyn and vancomycin and the patient got Levaquin.    Follow the cultures, try to see if any sputum cultures could be  gotten, send urine for Streptococcus pneumoniae antigen, and follow  radiological studies and clinical status and taper treatment  accordingly. Long-term prognosis guarded to poor. Care plan  discussed with the family members at the bedside and Dr. Starla Clark  also. Thank you doctors for involving me in the care of this nice gentleman  going through troubled times.         Sacha Paniagua M.D.    D: 10/13/2017 12:55:59       T: 10/13/2017 14:40:14     [email or web address]  Job#: [id number]     Doc#: [id number]

## 2017-10-13 NOTE — PROGRESS NOTES
Pharmacy Medication History Note      List of current medications patient is taking is complete. Source of information: MAR    Changes made to medication list:      Other notes (ex. Recent course of antibiotics, Coumadin dosing):  · Warfarin has been DC'd since 10/5 except 1x dose given today  · Oral vancomycin finished on 10/7  · Denies use of other OTC or herbal medications.       Allergies reviewed      Electronically signed by Sebastian Felipe, [address] [place] [address] on 10/12/2017 at 10:25 PM

## 2017-10-13 NOTE — FLOWSHEET NOTE
During my contact with the Palliative Care patient, there were no family members present. I offered words of encouragement, a prayer of comfort and healing at pts bedside. I also placed a spiritual care card in the room. Spiritual plan: It would be helpful if Spiritual Care would continue to follow up on this case.       10/13/17 0958   Encounter Summary   Services provided to: Patient   Referral/Consult From: Raimundo   Continue Visiting Yes  (10/13/17 Palliative Care pt. )   Complexity of Encounter Moderate   Length of Encounter 15 minutes   Routine   Type Initial   Assessment Approachable   Intervention Nurtured hope   Outcome Coping   Spiritual/Mandaen   Type Spiritual support

## 2017-10-13 NOTE — FLOWSHEET NOTE
Pt was anointed. He was asleep but the family was there. 10/13/17 3496   Encounter Summary   Services provided to: Patient and family together   Referral/Consult From: [address] [facility] Family members   [facility] Visiting Yes  (10/13 )   Complexity of Encounter Low   Length of Encounter 15 minutes   Routine   Type Follow up   Assessment Approachable;Calm   Intervention Prayer;Alden;Nurtured hope   Outcome Acceptance;Expressed gratitude;Encouraged; Hopeful;Receptive   Sacraments   Sacrament of Sick-Anointing Anointed

## 2017-10-14 ENCOUNTER — APPOINTMENT (OUTPATIENT)
Dept: GENERAL RADIOLOGY | Age: 77
DRG: 871 | End: 2017-10-14
Payer: MEDICARE

## 2017-10-14 LAB
ALBUMIN SERPL-MCNC: 2 G/DL (ref 3.5–5.1)
ALLEN TEST: POSITIVE
ALP BLD-CCNC: 98 U/L (ref 38–126)
ALT SERPL-CCNC: 14 U/L (ref 11–66)
ANION GAP SERPL CALCULATED.3IONS-SCNC: 16 MEQ/L (ref 8–16)
ANISOCYTOSIS: ABNORMAL
APTT: 31.9 SECONDS (ref 22–38)
AST SERPL-CCNC: 22 U/L (ref 5–40)
BANDED NEUTROPHILS ABSOLUTE COUNT: 2.8 THOU/MM3
BANDS PRESENT: 13 %
BASE EXCESS (CALCULATED): -2.4 MMOL/L (ref -2.5–2.5)
BASOPHILS # BLD: 0 %
BASOPHILS ABSOLUTE: 0 THOU/MM3 (ref 0–0.1)
BILIRUB SERPL-MCNC: 0.5 MG/DL (ref 0.3–1.2)
BILIRUBIN DIRECT: < 0.2 MG/DL (ref 0–0.3)
BUN BLDV-MCNC: 43 MG/DL (ref 7–22)
CALCIUM IONIZED: 1.01 MMOL/L (ref 1.12–1.32)
CALCIUM SERPL-MCNC: 8 MG/DL (ref 8.5–10.5)
CHLORIDE BLD-SCNC: 107 MEQ/L (ref 98–111)
CO2: 20 MEQ/L (ref 23–33)
COLLECTED BY:: ABNORMAL
CREAT SERPL-MCNC: 1.9 MG/DL (ref 0.4–1.2)
DEVICE: ABNORMAL
DIFFERENTIAL, MANUAL: NORMAL
EKG ATRIAL RATE: 105 BPM
EKG ATRIAL RATE: 146 BPM
EKG P AXIS: 67 DEGREES
EKG P AXIS: 88 DEGREES
EKG P-R INTERVAL: 112 MS
EKG P-R INTERVAL: 120 MS
EKG Q-T INTERVAL: 290 MS
EKG Q-T INTERVAL: 350 MS
EKG QRS DURATION: 76 MS
EKG QRS DURATION: 84 MS
EKG QTC CALCULATION (BAZETT): 451 MS
EKG QTC CALCULATION (BAZETT): 462 MS
EKG R AXIS: -120 DEGREES
EKG R AXIS: 36 DEGREES
EKG T AXIS: 65 DEGREES
EKG T AXIS: 81 DEGREES
EKG VENTRICULAR RATE: 105 BPM
EKG VENTRICULAR RATE: 146 BPM
EOSINOPHIL # BLD: 0 %
EOSINOPHIL SMEAR: NORMAL
EOSINOPHILS ABSOLUTE: 0 THOU/MM3 (ref 0–0.4)
GFR SERPL CREATININE-BSD FRML MDRD: 34 ML/MIN/1.73M2
GLUCOSE BLD-MCNC: 107 MG/DL (ref 70–108)
HCO3: 19 MMOL/L (ref 23–28)
HCT VFR BLD CALC: 33.4 % (ref 42–52)
HEMOGLOBIN: 10.6 GM/DL (ref 14–18)
IFIO2: 25
INR BLD: 1.31 (ref 0.85–1.13)
LACTIC ACID: 2.4 MMOL/L (ref 0.5–2.2)
LIPASE: 6.9 U/L (ref 5.6–51.3)
LYMPHOCYTES # BLD: 5 %
LYMPHOCYTES ABSOLUTE: 1.1 THOU/MM3 (ref 1–4.8)
MAGNESIUM: 2.1 MG/DL (ref 1.6–2.4)
MCH RBC QN AUTO: 28.6 PG (ref 27–31)
MCHC RBC AUTO-ENTMCNC: 31.7 GM/DL (ref 33–37)
MCV RBC AUTO: 90.1 FL (ref 80–94)
MODE: ABNORMAL
MONOCYTES # BLD: 4 %
MONOCYTES ABSOLUTE: 0.9 THOU/MM3 (ref 0.4–1.3)
NUCLEATED RED BLOOD CELLS: 0 /100 WBC
O2 SATURATION: 94 %
ORGANISM: ABNORMAL
PCO2: 24 MMHG (ref 35–45)
PDW BLD-RTO: 18.2 % (ref 11.5–14.5)
PH BLOOD GAS: 7.5 (ref 7.35–7.45)
PHOSPHORUS: 3.8 MG/DL (ref 2.4–4.7)
PLATELET # BLD: 242 THOU/MM3 (ref 130–400)
PLATELET ESTIMATE: ADEQUATE
PMV BLD AUTO: 8.9 MCM (ref 7.4–10.4)
PO2: 62 MMHG (ref 71–104)
POTASSIUM SERPL-SCNC: 5.4 MEQ/L (ref 3.5–5.2)
PROCALCITONIN: 21.98 NG/ML (ref 0.01–0.09)
RBC # BLD: 3.71 MILL/MM3 (ref 4.7–6.1)
RBC # BLD: ABNORMAL 10*6/UL
SEG NEUTROPHILS: 78 %
SEGMENTED NEUTROPHILS ABSOLUTE COUNT: 16.8 THOU/MM3 (ref 1.8–7.7)
SET PEEP: 7 MMHG
SET PRESS SUPP: 4 CMH2O
SODIUM BLD-SCNC: 143 MEQ/L (ref 135–145)
SOURCE, BLOOD GAS: ABNORMAL
SPECIMEN: NORMAL
TOTAL PROTEIN: 5.3 G/DL (ref 6.1–8)
URINE CULTURE REFLEX: ABNORMAL
VANCOMYCIN TROUGH: 15.3 UG/ML (ref 5–15)
WBC # BLD: 21.6 THOU/MM3 (ref 4.8–10.8)

## 2017-10-14 PROCEDURE — 93005 ELECTROCARDIOGRAM TRACING: CPT | Performed by: ANESTHESIOLOGY

## 2017-10-14 PROCEDURE — 85730 THROMBOPLASTIN TIME PARTIAL: CPT

## 2017-10-14 PROCEDURE — 2100000000 HC CCU R&B

## 2017-10-14 PROCEDURE — 83605 ASSAY OF LACTIC ACID: CPT

## 2017-10-14 PROCEDURE — 74000 XR ABDOMEN LIMITED (KUB): CPT

## 2017-10-14 PROCEDURE — 83735 ASSAY OF MAGNESIUM: CPT

## 2017-10-14 PROCEDURE — 2580000003 HC RX 258: Performed by: ANESTHESIOLOGY

## 2017-10-14 PROCEDURE — 83690 ASSAY OF LIPASE: CPT

## 2017-10-14 PROCEDURE — 36415 COLL VENOUS BLD VENIPUNCTURE: CPT

## 2017-10-14 PROCEDURE — 99233 SBSQ HOSP IP/OBS HIGH 50: CPT | Performed by: INTERNAL MEDICINE

## 2017-10-14 PROCEDURE — 94669 MECHANICAL CHEST WALL OSCILL: CPT

## 2017-10-14 PROCEDURE — 6370000000 HC RX 637 (ALT 250 FOR IP): Performed by: ANESTHESIOLOGY

## 2017-10-14 PROCEDURE — 82803 BLOOD GASES ANY COMBINATION: CPT

## 2017-10-14 PROCEDURE — 2500000003 HC RX 250 WO HCPCS: Performed by: NURSE PRACTITIONER

## 2017-10-14 PROCEDURE — 6360000002 HC RX W HCPCS: Performed by: ANESTHESIOLOGY

## 2017-10-14 PROCEDURE — A4421 OSTOMY SUPPLY MISC: HCPCS

## 2017-10-14 PROCEDURE — 82248 BILIRUBIN DIRECT: CPT

## 2017-10-14 PROCEDURE — 2700000000 HC OXYGEN THERAPY PER DAY

## 2017-10-14 PROCEDURE — 82330 ASSAY OF CALCIUM: CPT

## 2017-10-14 PROCEDURE — 84100 ASSAY OF PHOSPHORUS: CPT

## 2017-10-14 PROCEDURE — 36600 WITHDRAWAL OF ARTERIAL BLOOD: CPT

## 2017-10-14 PROCEDURE — 94640 AIRWAY INHALATION TREATMENT: CPT

## 2017-10-14 PROCEDURE — 71010 XR CHEST PORTABLE: CPT

## 2017-10-14 PROCEDURE — 6360000002 HC RX W HCPCS: Performed by: EMERGENCY MEDICINE

## 2017-10-14 PROCEDURE — 80202 ASSAY OF VANCOMYCIN: CPT

## 2017-10-14 PROCEDURE — 84145 PROCALCITONIN (PCT): CPT

## 2017-10-14 PROCEDURE — 99232 SBSQ HOSP IP/OBS MODERATE 35: CPT | Performed by: INTERNAL MEDICINE

## 2017-10-14 PROCEDURE — 80053 COMPREHEN METABOLIC PANEL: CPT

## 2017-10-14 PROCEDURE — 94660 CPAP INITIATION&MGMT: CPT

## 2017-10-14 PROCEDURE — C9113 INJ PANTOPRAZOLE SODIUM, VIA: HCPCS | Performed by: ANESTHESIOLOGY

## 2017-10-14 PROCEDURE — 85025 COMPLETE CBC W/AUTO DIFF WBC: CPT

## 2017-10-14 PROCEDURE — 6370000000 HC RX 637 (ALT 250 FOR IP): Performed by: INTERNAL MEDICINE

## 2017-10-14 PROCEDURE — 85610 PROTHROMBIN TIME: CPT

## 2017-10-14 PROCEDURE — 2580000003 HC RX 258: Performed by: INTERNAL MEDICINE

## 2017-10-14 RX ORDER — SODIUM POLYSTYRENE SULFONATE 15 G/60ML
15 SUSPENSION ORAL; RECTAL ONCE
Status: COMPLETED | OUTPATIENT
Start: 2017-10-14 | End: 2017-10-14

## 2017-10-14 RX ADMIN — ACETAMINOPHEN 650 MG: 650 SUPPOSITORY RECTAL at 22:04

## 2017-10-14 RX ADMIN — SODIUM CHLORIDE: 9 INJECTION, SOLUTION INTRAVENOUS at 10:00

## 2017-10-14 RX ADMIN — SODIUM POLYSTYRENE SULFONATE 15 G: 15 SUSPENSION ORAL; RECTAL at 09:08

## 2017-10-14 RX ADMIN — CETIRIZINE HYDROCHLORIDE 10 MG: 10 TABLET, FILM COATED ORAL at 07:52

## 2017-10-14 RX ADMIN — FOLIC ACID 1 MG: 1 TABLET ORAL at 07:52

## 2017-10-14 RX ADMIN — ACETAMINOPHEN 650 MG: 650 SUPPOSITORY RECTAL at 07:52

## 2017-10-14 RX ADMIN — FINASTERIDE 5 MG: 5 TABLET, FILM COATED ORAL at 07:52

## 2017-10-14 RX ADMIN — Medication 325 MG: at 07:52

## 2017-10-14 RX ADMIN — OXYCODONE HYDROCHLORIDE 5 MG: 5 TABLET ORAL at 11:17

## 2017-10-14 RX ADMIN — LEVOTHYROXINE SODIUM 100 MCG: 100 TABLET ORAL at 07:19

## 2017-10-14 RX ADMIN — SODIUM CHLORIDE: 9 INJECTION, SOLUTION INTRAVENOUS at 01:08

## 2017-10-14 RX ADMIN — VANCOMYCIN HYDROCHLORIDE 1000 MG: 1 INJECTION, SOLUTION INTRAVENOUS at 22:42

## 2017-10-14 RX ADMIN — TAZOBACTAM SODIUM AND PIPERACILLIN SODIUM 3.38 G: 375; 3 INJECTION, SOLUTION INTRAVENOUS at 07:52

## 2017-10-14 RX ADMIN — TAZOBACTAM SODIUM AND PIPERACILLIN SODIUM 3.38 G: 375; 3 INJECTION, SOLUTION INTRAVENOUS at 14:14

## 2017-10-14 RX ADMIN — IPRATROPIUM BROMIDE AND ALBUTEROL SULFATE 3 ML: .5; 3 SOLUTION RESPIRATORY (INHALATION) at 15:51

## 2017-10-14 RX ADMIN — Medication 1 CAPSULE: at 20:06

## 2017-10-14 RX ADMIN — MICONAZOLE NITRATE: 2 POWDER TOPICAL at 01:08

## 2017-10-14 RX ADMIN — PANTOPRAZOLE SODIUM 40 MG: 40 INJECTION, POWDER, FOR SOLUTION INTRAVENOUS at 07:52

## 2017-10-14 RX ADMIN — Medication 10 ML: at 20:08

## 2017-10-14 RX ADMIN — Medication 10 ML: at 09:11

## 2017-10-14 RX ADMIN — IPRATROPIUM BROMIDE AND ALBUTEROL SULFATE 3 ML: .5; 3 SOLUTION RESPIRATORY (INHALATION) at 12:15

## 2017-10-14 RX ADMIN — TAZOBACTAM SODIUM AND PIPERACILLIN SODIUM 3.38 G: 375; 3 INJECTION, SOLUTION INTRAVENOUS at 22:42

## 2017-10-14 RX ADMIN — TRAZODONE HYDROCHLORIDE 50 MG: 50 TABLET ORAL at 20:06

## 2017-10-14 RX ADMIN — MICONAZOLE NITRATE: 2 POWDER TOPICAL at 20:06

## 2017-10-14 RX ADMIN — Medication 1 CAPSULE: at 07:52

## 2017-10-14 RX ADMIN — MICONAZOLE NITRATE: 2 POWDER TOPICAL at 07:52

## 2017-10-14 RX ADMIN — IPRATROPIUM BROMIDE AND ALBUTEROL SULFATE 3 ML: .5; 3 SOLUTION RESPIRATORY (INHALATION) at 19:31

## 2017-10-14 RX ADMIN — IPRATROPIUM BROMIDE AND ALBUTEROL SULFATE 3 ML: .5; 3 SOLUTION RESPIRATORY (INHALATION) at 08:03

## 2017-10-14 ASSESSMENT — PAIN SCALES - GENERAL
PAINLEVEL_OUTOF10: 5
PAINLEVEL_OUTOF10: 0

## 2017-10-14 NOTE — PLAN OF CARE
Problem: Falls - Risk of  Goal: Absence of falls  Outcome: Ongoing  Up witgh assist.    Problem: Skin Integrity - Impaired  Goal: Wound size and drainage will decrease and surrounding tissue/skin remains healthy and intact  Outcome: Ongoing      Problem: Nutrition  Goal: Optimal nutrition therapy  Outcome: Ongoing  Tube feedings have been started. Problem: Discharge Planning:  Goal: Discharged to appropriate level of care  Discharged to appropriate level of care   Outcome: Ongoing      Problem: Gas Exchange - Impaired:  Goal: Levels of oxygenation will improve  Levels of oxygenation will improve   Outcome: Ongoing      Problem: Infection, Septic Shock:  Goal: Will show no infection signs and symptoms  Will show no infection signs and symptoms   Outcome: Ongoing      Problem: Restraint Use - Nonviolent/Non-Self-Destructive Behavior:  Goal: Absence of restraint indications  Absence of restraint indications   No restraint related injury noted.   Goal: Absence of restraint-related injury  Absence of restraint-related injury   Outcome: Ongoing

## 2017-10-14 NOTE — H&P
evidence of aortic regurgitation. Left atrial  size was normal, right atrial size was normal, and right ventricular  size was normal with normal systolic function and wall thickness, that  was 07/2017. PAST FAMILY MEDICAL HISTORY:  No family medical history on file. SOCIAL HISTORY:  Former smoker, quit in 2009, smoked a pack and half a  day for 40-50 years. Never used smokeless tobacco.  Does not drink  alcohol, and is not using drugs of abuse. ALLERGIES:  ACETAMINOPHEN WITH UNSPECIFIED REACTION. NAMENDA WITH  ANXIETY AND AGITATION. ADVERSE REACTION TO DONEPEZIL GIVES HIM  NIGHTMARES. HOME MEDICATIONS:  1. He is on bisacodyl or Dulcolax 10 mg suppository rectally daily as  needed for constipation. 2.  He is on ferrous sulfate 325 mg by mouth daily. 3.  He is on finasteride or Proscar 5 mg by mouth daily. 4.  He is on folic acid or Folvite 1 mg by mouth daily. 5.  He is on ibuprofen or Advil 200 mg by mouth every 6 hours as  needed for pain. 6.  He is on DuoNebs 3 mL inhaled in the lungs every 4 hours while  awake. 7.  He is on Lactobacillus or Culturelle capsule one capsule by mouth  three times daily. 8.  He is on levothyroxine or Synthroid 100 mcg daily. 9.  He is on loratadine or Claritin 10 mg by mouth daily. 10.  He is lorazepam or Ativan 1 mg by mouth every six hours as needed  for anxiety. 11.  He is on Remeron or mirtazapine 15 mg by mouth nightly. 12.  He is on multivitamins and minerals 1 tablet by mouth daily. 13.  He is on nitroglycerin or Nitrostat 0.4 mg sublingual under the  tongue every 5 minutes for three doses p.r.n., chest pain. 14.  Omeprazole or Prilosec 20 mg delayed release capsule by mouth  nightly. 15.  Oxycodone or Roxicodone 5 mg immediate release tablet three times  daily. 16.  Roxicodone or oxycodone 5 mg immediate release tablet by mouth  every 4 hours as needed for pain.   17.  Potassium chloride or Klor-Con 20 mEq extended release tablet,  takes 40 mg by Left pedal pulses are 1+/4+ bilaterally. He is  status post right AKA recently. There are some wounds on his stump  which need to be attended too. There is also an incision in his right  groin, which is clean, dry and intact, but there is a surgical  incision for some surgery, probably a vascular surgery. NEUROLOGIC:  Agitated, confused, and demented. He is not verbal, he  is just choking away, breathing hard, and packing away. Unable to  cooperate with neurological testing. INTEGUMENT:  Warm and dry. There are some wounds on his button and  excoriation around the anus. No wounds on the stump of the right knee  amputation. Otherwise, is warm and dry and intact. No rashes and no  erythema. EKG:  EKG shows sinus tachycardia, this has fusion complexes, but  somewhat wavering of the _____ lines that I think it is hard to tell  what I am seeing the fusion complexes myself. He is tachycardic at  146 beats per minute. Right superior axis deviation. Pulmonary  disease pattern. This cannot rule out inferior infarct, but there is  so much baseline drip. I cannot really read it very well at all. CHEST X-RAY PORTABLE:  Heart size is normal.  Mediastinum is not  widened. There is subtle interstitial edema seen as a Kerley B lines  of the lower lobes. Pulmonary vessels are not congested. No  confluent infiltrates are seen, but more prominent peribronchial  markings are present in the infrahilar regions bilaterally. This  could represent developing peribronchial infiltrates. LABS:  Sodium 138. Potassium 5.9, which is up. Chloride 99. Bicarb  25. BUN is up at 24. Creatinine 1.2. Estimated GFR 58. Anion gap  14.0. Magnesium is 2.0. Ionized calcium is 1.05. Lactic acid is up  just a hair at 2.3. Glucose 156. Calcium 8.5. Osmolality 282.9. Phosphorus 4.7. Total protein 6.6. Total CK is 121. ProBNP is  743.4, which is within normal limits.   Troponin T is less than 0.023,  which is positive. LFTs show albumin low at 2.6 with rest of the LFTs  are normal.  CBC showed white blood cell count of 28.8 with a 90% left  shift and neutrophils. H and H is down at 12.4 and 39.1. RBC indices  are within normal limits except MCHC down at 31.7. RDW is at 17.9,  and there is 1+ anisocytosis in the RBC morphology. Platelet count is  874. INR is 1. 16. APTT is 30.0. Blood cultures are pending. Urinalysis showed dark brown urine, pH 5.0, specific gravity greater  than 1.030, trace ketones, moderate bilirubin, large amount of blood,  100 of protein, positive nitrites, and moderate leukocyte esterase. ASSESSMENT AND PLAN:  1. Sepsis, probably of urinary tract origin. Have him on Zosyn and  vancomycin to cover bases just in case we do not know for sure where  the source is from. But, the UTI is the one source we have this known  infection at the present moment. 2.  Hyperkalemia. I have given him calcium gluconate, D50W and 10  units of regular insulin once, and I have also given him 30 of  Kayexalate. We will check his potassium about four hours after the  Kayexalate is given. 3.  He is anticoagulated on warfarin, but he is subtherapeutic, have  Pharmacy manage the Coumadin dose, also Pharmacy is managing  vancomycin. 4.  Elevated troponins. We will have him on aspirin 81 mg  enteric-coated tablet as well as Plavix loading dose of 300 mg and  Plavix 75 mg daily. 5.  DVT prophylaxis with enoxaparin injections subcu. 6.  Allergies. Continue his cetirizine. 7.  Continue his ferrous sulfate for his iron deficiency anemia. He  is still anemic though. Check his reticulocyte count. 8.  BPH. Have him on finasteride and tamsulosin which will continue. He has got a Conte in place and Haldol. 9.  Hypothyroidism. Continue his levothyroxine. 10.  Dementia. Continue his Remeron. 11.  GI prophylaxis with Protonix injection 40 mg daily.     I spent 70 minutes evaluating and managing the patient

## 2017-10-14 NOTE — PROGRESS NOTES
INFECTIOUS DISEASES  PROGRESS NOTE    Pt Name: Lyle Alfonso  MRN: [id number]  [id number]  YOB: 1940  Admit Date: 10/12/2017 10:02 PM  Date of evaluation: 10/14/2017  Primary Care Physician: Kristen Almaguer MD   [id number]   53-year-old male with a history of stroke and also problems with dementia, Alzheimer's  disease, GERD, BPH, and history of suprapubic catheter in the past and  also significant problems with the patient undergoing right lower  extremity AKA amputation done. The patient had this amputation done 8  weeks ago and prior to this admission on 10/13/2017. The patient had  been noted to be hypoxemic and had been placed on BiPAP. Noted to be  also hypotensive and hypoxic. Had c.difficle positive on 9/20/17    Subjective: Interval History: As above. Notes reviewed.   On ventimask     Active ATBs: zosyn vanco 10/13    Pt/Chart review:  Fever No, DiarrheaNo, Dyspnea No, Nausea:None      Data:   Scheduled Meds:   calcium replacement protocol   Other RX Placeholder    phosphorus replacement protocol   Other RX Placeholder    magnesium replacement protocol   Other RX Placeholder    ferrous sulfate  325 mg Oral Daily with breakfast    finasteride  5 mg Oral Daily    folic acid  1 mg Oral Daily    ipratropium-albuterol  3 mL Inhalation Q4H WA    lactobacillus  1 capsule Oral BID    levothyroxine  100 mcg Oral Daily    cetirizine  10 mg Oral Daily    traZODone  50 mg Oral Nightly    sodium chloride flush  10 mL Intravenous 2 times per day    vancomycin (VANCOCIN) intermittent dosing (placeholder)   Other RX Placeholder    pantoprazole  40 mg Intravenous Daily    dextrose  25 g Intravenous Once    insulin regular  8 Units Intravenous Once    bisacodyl  10 mg Rectal Daily    vancomycin  1,000 mg Intravenous Q24H    piperacillin-tazobactam  3.375 g Intravenous Q8H    miconazole   Topical BID     Continuous Infusions:   sodium chloride 125 mL/hr at 10/14/17 1000    [facility])     Multiple fractures of ribs of right side     Obesity (BMI 30-39. 9)     Obstructive sleep apnea treated with BiPAP     Back pain     Fall     Unable to ambulate     Dysphagia     Acute midline low back pain without sciatica     Inability to walk     Mental confusion     Late onset Alzheimer's disease with behavioral disturbance  and mental confusion     Chest wall pain  left lower chest     Dyspnea and respiratory abnormalities     Sepsis ([address]     Acute respiratory distress     Hematemesis     Blood poisoning ([address]     Pneumonia of both lower lobes due to infectious organism     Severe malnutrition (HCC)     Acute respiratory failure with hypoxia (HCC)     Acute kidney injury ([address]     Hyperkalemia     Hypocalcemia     Urinary tract infection without hematuria     Aspiration pneumonia of both lower lobes due to gastric secretions (HCC)     Coffee ground emesis     Benign prostatic hyperplasia     COPD without exacerbation (HCC)     Unilateral AKA, right ([address]     History of arterial occlusion      Plan:  Continue antibiotics   Monitor closely    Labs, cultures, and radiographs reviewed. Changes made as necessary. D/w Patient's R.N. and specific instructions given and infectious disease issues addressed. Will follow the patient closely with you. Also please see the orders for updated patient care orders written by ID team.    Thank you for involving us in this patient's care. I will be discussing this case with the treating physicians. Please don't hesitate to call me if any questions, issues  or concerns      Please see orders for updated patient care orders written today.   Electronically signed by Rikki Sanchez on 10/14/2017 at 12:42 PM

## 2017-10-14 NOTE — PLAN OF CARE
Problem: Falls - Risk of  Goal: Absence of falls  Outcome: Ongoing  Patient is confused, can only state his name; otherwise makes inappropriate statements; did state x1 that he was cold & warm blanket placed. Problem: Skin Integrity - Impaired  Goal: Wound size and drainage will decrease and surrounding tissue/skin remains healthy and intact    Intervention: PROTECT SKIN WITH MOISTURE BARRIER CREAM  Entire perineum is reddened, excoriated with skin peeling; cleansed and kept dry until Desenex powder order obtained. Even within 4 hours of keeping dry, the redness had improved. Problem: Nutrition  Goal: Optimal nutrition therapy  Outcome: Ongoing  N/G had been draining dark brown drainage; kept clamped since unknown if it's causing irritation. Problem: Discharge Planning:  Goal: Discharged to appropriate level of care  Discharged to appropriate level of care   Outcome: Ongoing  Not even close to going home due to the confusion and now since has lost a leg and children do not live close, it's highly doubtful whether patient will be able to return home. Apparently daughter in [facility] is in state of denial of patient's Alzheimer state and expects patient's memory to return. Problem: Gas Exchange - Impaired:  Goal: Levels of oxygenation will improve  Levels of oxygenation will improve   Outcome: Ongoing  Bipap 11/7 is effective in increasing O2 sats.   Suspicion for aspiration will have to be evaluated as at nursing home had thickened liquids    Problem: Infection, Septic Shock:  Goal: Will show no infection signs and symptoms  Will show no infection signs and symptoms   Outcome: Ongoing  Temp continues low grade    Problem: Restraint Use - Nonviolent/Non-Self-Destructive Behavior:  Goal: Absence of restraint indications  Absence of restraint indications   Outcome: Ongoing  At beginning of shift, patient started hitting personnel & tried to pull out N/G; bilateral wrist restraints applied and patient settled down  Goal: Absence of restraint-related injury  Absence of restraint-related injury   Outcome: Ongoing  No injury noted as patient settled right down when restraints applied.

## 2017-10-14 NOTE — PLAN OF CARE
Problem: RESPIRATORY  Goal: Normal spontaneous ventilation  Outcome: Ongoing  Pt has diminished breath sounds t/o. Pt is on 2 lpm via nc, no signs of respiratory distress noted at this time. Pt is very confused, resp rate in high 20's, low 30's. Will continue with therapy as ordered to improve aeration.

## 2017-10-14 NOTE — CONSULTS
was called. Patient became hypotensive with BP in 80's was given liter bolus at nursing home. Another liter was given during transport with EMS.      Events last 24h:    Febrile  Mild hypoxemia easily corrected with NC  Confused  Mildly tachycardic/Mildly hypotensive  +3 L positive fluid balance  Family History         Problem Relation Age of Onset    Other Mother      Sleep History   On CPAP at home  ROS    Unable to obtain  Meds      ferrous sulfate  325 mg Oral Daily with breakfast    finasteride  5 mg Oral Daily    folic acid  1 mg Oral Daily    ipratropium-albuterol  3 mL Inhalation Q4H WA    lactobacillus  1 capsule Oral BID    levothyroxine  100 mcg Oral Daily    cetirizine  10 mg Oral Daily    traZODone  50 mg Oral Nightly    sodium chloride flush  10 mL Intravenous 2 times per day    vancomycin (VANCOCIN) intermittent dosing (placeholder)   Other RX Placeholder    pantoprazole  40 mg Intravenous Daily    dextrose  25 g Intravenous Once    insulin regular  8 Units Intravenous Once    bisacodyl  10 mg Rectal Daily    vancomycin  1,000 mg Intravenous Q24H    piperacillin-tazobactam  3.375 g Intravenous Q8H    miconazole   Topical BID     PRN  bisacodyl, LORazepam, nitroGLYCERIN, oxyCODONE, sodium chloride flush, magnesium hydroxide, ondansetron, glucose, dextrose, glucagon (rDNA), dextrose, acetaminophen  IV   sodium chloride 125 mL/hr at 10/14/17 0108    dextrose       Home  Prescriptions Prior to Admission: warfarin (COUMADIN) 1 MG tablet, Take 0.5 mg by mouth daily  ferrous sulfate 325 (65 Fe) MG tablet, Take 325 mg by mouth daily (with breakfast)  folic acid (FOLVITE) 1 MG tablet, Take 1 mg by mouth daily  loratadine (CLARITIN) 10 MG tablet, Take 10 mg by mouth daily  omeprazole (PRILOSEC) 20 MG delayed release capsule, Take 20 mg by mouth nightly  potassium chloride (KLOR-CON M) 20 MEQ extended release tablet, Take 40 mEq by mouth daily  traZODone (DESYREL) 50 MG tablet, Take 50 mg by mouth nightly  lactobacillus (CULTURELLE) capsule, Take 1 capsule by mouth 2 times daily  oxyCODONE (ROXICODONE) 5 MG immediate release tablet, Take 5 mg by mouth 3 times daily . LORazepam (ATIVAN) 1 MG tablet, Take 1 mg by mouth every 6 hours as needed for Anxiety  bisacodyl (DULCOLAX) 10 MG suppository, Place 10 mg rectally daily as needed for Constipation  ibuprofen (ADVIL;MOTRIN) 200 MG tablet, Take 200 mg by mouth every 6 hours as needed for Pain  oxyCODONE (ROXICODONE) 5 MG immediate release tablet, Take 5 mg by mouth every 4 hours as needed for Pain . finasteride (PROSCAR) 5 MG tablet, Take 5 mg by mouth daily  Multiple Vitamins-Minerals (CERTAGEN PO), Take 1 tablet by mouth daily   nitroGLYCERIN (NITROSTAT) 0.4 MG SL tablet, Place 1 tablet under the tongue every 5 minutes as needed for Chest pain  ipratropium-albuterol (DUONEB) 0.5-2.5 (3) MG/3ML SOLN nebulizer solution, Inhale 3 mLs into the lungs every 4 hours (while awake)  tamsulosin (FLOMAX) 0.4 MG capsule, Take 1 capsule by mouth daily  levothyroxine (SYNTHROID) 100 MCG tablet, Take 100 mcg by mouth Daily  mirtazapine (REMERON) 15 MG tablet, Take 1 tablet by mouth nightly  Diet/Nutrition   DIET CARDIAC; Diet Tube Feed Continuous/Cyclic w/ Diet  Mechanical Ventilation Data   Vent Information  Vt Exhaled: 613 mL  FiO2 : 25 %  Additional Respiratory  Assessments  Pulse: 104  Resp: 29  SpO2: 97 %  Oral Care: Lip moisturizer applied, Mouth swabbed  ABG   Lab Results   Component Value Date    PH 7.50 10/14/2017    PCO2 24 10/14/2017    PO2 62 10/14/2017    HCO3 19 10/14/2017    O2SAT 94 10/14/2017     Lab Results   Component Value Date    IFIO2 25 10/14/2017    MODE CPAP/PS 10/14/2017    SETPEEP 7.0 10/14/2017     Vitals    height is 5' 4\" (1.626 m) and weight is 152 lb 13.9 oz (69.3 kg). His core temperature is 101.1 °F (38.4 °C). His blood pressure is 109/84 and his pulse is 104. His respiration is 29 and oxygen saturation is 97%.      O2 Flow Rate Musculoskeletal:  RLE AKA stump intact. Neurological: Disoriented, confused, not agitated    Labs   ABG  Lab Results   Component Value Date    PH 7.50 10/14/2017    PO2 62 10/14/2017    PCO2 24 10/14/2017    HCO3 19 10/14/2017    O2SAT 94 10/14/2017     Lab Results   Component Value Date    IFIO2 25 10/14/2017    MODE CPAP/PS 10/14/2017    SETPEEP 7.0 10/14/2017     CBC  Recent Labs      10/12/17   2225  10/13/17   0603  10/14/17   0419   WBC  28.8*  24.4*  21.6*   RBC  4.30*  4.18*  3.71*   HGB  12.4*  12.0*  10.6*   HCT  39.1*  37.8*  33.4*   MCV  91.0  90.5  90.1   MCH  28.9  28.8  28.6   MCHC  31.7*  31.8*  31.7*   RDW  17.9*  18.2*  18.2*   PLT  374  301  242   MPV  8.4  8.4  8.9      BMP  Recent Labs      10/12/17   2225  10/13/17   0510  10/13/17   1718  10/14/17   0419   NA  138  139  139  143   K  5.9*  6.6*  5.6*  5.4*   CL  99  99  102  107   CO2  25  21*  22*  20*   BUN  24*  29*  38*  43*   CREATININE  1.2  1.8*  2.0*  1.9*   GLUCOSE  156*  142*  123*  107   MG  2.0  2.0   --   2.1   PHOS  4.7  4.5   --   3.8   CALCIUM  8.5  8.3*  8.4*  8.0*     LFT  Recent Labs      10/12/17   2225  10/14/17   0419   AST  18  22   ALT  15  14   BILITOT  0.6  0.5   ALKPHOS  118  98   LIPASE  11.6  6.9     TROP  Lab Results   Component Value Date    TROPONINT 0.019 10/13/2017    TROPONINT 0.021 10/13/2017    TROPONINT 0.030 10/13/2017     BNP  No results for input(s): BNP in the last 72 hours. Lactic Acid  Recent Labs      10/13/17   0603  10/13/17   0812  10/14/17   0419   LACTA  3.4*  3.4*  2.4*     INR  Recent Labs      10/12/17   2225  10/13/17   0510  10/14/17   0419   INR  1.16*  1.35*  1.31*     PTT  Recent Labs      10/12/17   2225  10/14/17   0419   APTT  30.0  31.9     Glucose  No results for input(s): POCGLU in the last 72 hours.   UA   Recent Labs      10/12/17   2216  10/13/17   0500   SPECGRAV   --   >1.030*   PHUR  8.0  5.0   COLORU  DK YELLOW*  DARK BROWN   PROTEINU  100*  100*   BLOODU  LARGE* additional evaluation. 3. Numerous additional findings as described in the body of the report. (See actual reports for details)  SYSTEMS ASSESSMENT AND PLANS     CNS   Remains confused and disoriented  PMH of CVA, Dementia, Alzheimer's  Respiratory   Stable oxygenation and ventilation on 2 lpm NC  Poss aspiration pneumonia on good ATB coverage  Needs SLP avl before oral ingestion is recommended   CINTHIA CPAP to sleep  Cardiovascular   Tachycardia  Fluid volume is better  Social/Spiritual/DNR/Miscellaneous   Limited code X 4  Discussed with Hospitalist   Thank you for the consult and allowing us to participate in the care of your patient. Case discussed with nurse, Dr. Frankie Young and patient. Questions and concerns addressed. Meds and Orders reviewed.     Electronically signed by     Sven Leonard MD on 10/14/2017 at 9:38 AM

## 2017-10-14 NOTE — PROGRESS NOTES
Hospitalist Progress Note    Patient:  Asa Duty      Unit/Bed:3A-01/001-A    YOB: 1940    MRN: [id number]       Acct: [de-identified]     PCP: Rosie Martino MD    Date of Admission: 10/12/2017    Chief Complaint: change in mental status and coffee ground emesis      Patient was transferred from nursing home for further evaluation of change in mental status, tachypnea and hypoxia with oxygen saturation in the 70s. Apparently family physician was called by the nursing home last night saying that the patient was having coffee ground emesis and she was trying to manage him at the nursing home but patient continued to worsen as he became tachypneic and tachycardic and EMS was called and apparently patient was noticed to be hypotensive with blood pressure in 80s and a liter of bolus was given there.      Vitals here showed blood pressure of 117/32, respiratory rate of 42, heart rate of 128, temperature of 103 an oxygen saturation of 86% on 6 L. ABG showed pH of 7.39, PCO2 of 45, PO2 of 66 on 6 L. Labs showed elevated WBC of 28,800 with bandemia, lactic acidosis, potassium of 5.9. Urine analysis showed positive nitrite and leukocyte esterase. Chest x-ray suggested interstitial edema. Hospital Course:   Patient was started on broad-spectrum antibiotics, discussed with wife and she did not want intubation but wanted him to be treated medically, also got updated from PCP about his past medical problems.      10/14- patient coughing up little coffee ground material, possibly from previous aspiration, still tachypneic, BP on little low side, continue IVF and ABX, NG tube, will start NG tube feeding, has good BM's more liquidy  From kayexalate, urine out put not documented well as per RN, suspected oliguria but patient had good urine output as per nursing staff, has rhonchi in the lungs, continue current treatment    Subjective: patient is pleasenntly confused on restraints, wanted the light to be turned off, otherwise did not make any meaning ful answers      Medications:  Reviewed    Infusion Medications    sodium chloride 125 mL/hr at 10/14/17 0108    dextrose       Scheduled Medications    sodium polystyrene  15 g Oral Once    ferrous sulfate  325 mg Oral Daily with breakfast    finasteride  5 mg Oral Daily    folic acid  1 mg Oral Daily    ipratropium-albuterol  3 mL Inhalation Q4H WA    lactobacillus  1 capsule Oral BID    levothyroxine  100 mcg Oral Daily    cetirizine  10 mg Oral Daily    traZODone  50 mg Oral Nightly    sodium chloride flush  10 mL Intravenous 2 times per day    vancomycin (VANCOCIN) intermittent dosing (placeholder)   Other RX Placeholder    pantoprazole  40 mg Intravenous Daily    dextrose  25 g Intravenous Once    insulin regular  8 Units Intravenous Once    bisacodyl  10 mg Rectal Daily    vancomycin  1,000 mg Intravenous Q24H    piperacillin-tazobactam  3.375 g Intravenous Q8H    miconazole   Topical BID     PRN Meds: bisacodyl, LORazepam, nitroGLYCERIN, oxyCODONE, sodium chloride flush, magnesium hydroxide, ondansetron, glucose, dextrose, glucagon (rDNA), dextrose, acetaminophen      Intake/Output Summary (Last 24 hours) at 10/14/17 0733  Last data filed at 10/14/17 0529   Gross per 24 hour   Intake          4205.93 ml   Output              220 ml   Net          3985.93 ml       Diet:  DIET CARDIAC;   Diet Tube Feed Continuous/Cyclic w/ Diet    Exam:  /77   Pulse 104   Temp 101.1 °F (38.4 °C) (Core)   Resp 29   Ht 5' 4\" (1.626 m)   Wt 152 lb 13.9 oz (69.3 kg)   SpO2 97%   BMI 26.24 kg/m²     Physical Examination:   General appearance - alert, awake  But plesently confused and  in mild distress, tachypenic, NFgtube  HEENT: Normocephalic, Atraumatic, pupils reactive, mucous membranes little dry  Chest - moving equally to respiration,symmetric air entry, rhonchi bilateral on auscultation,  Heart - tachycardiac, regular rhythm, normal S1, S2, no murmurs  Abdomen - soft, nontender, nondistended, no masses or organomegaly, BS- sluggish+  Neurological - alert, oriented to name but not to time or palce, normal speech, sensations intact and able to move all extremities, right AKA+  Extremities - peripheral pulses 1+, no pedal edema,  Capillary refill less than 3 sec  Skin - normal coloration and turgor, no rashes,   Foleys+        Labs:   Recent Labs      10/12/17   2225  10/13/17   0603  10/14/17   0419   WBC  28.8*  24.4*  21.6*   HGB  12.4*  12.0*  10.6*   HCT  39.1*  37.8*  33.4*   PLT  374  301  242     Recent Labs      10/12/17   2225  10/13/17   0510  10/13/17   1718  10/14/17   0419   NA  138  139  139  143   K  5.9*  6.6*  5.6*  5.4*   CL  99  99  102  107   CO2  25  21*  22*  20*   BUN  24*  29*  38*  43*   CREATININE  1.2  1.8*  2.0*  1.9*   CALCIUM  8.5  8.3*  8.4*  8.0*   PHOS  4.7  4.5   --   3.8     Recent Labs      10/12/17   2225  10/14/17   0419   AST  18  22   ALT  15  14   BILIDIR  <0.2  <0.2   BILITOT  0.6  0.5   ALKPHOS  118  98     Recent Labs      10/12/17   2225  10/13/17   0510  10/14/17   0419   INR  1.16*  1.35*  1.31*     Recent Labs      10/12/17   2225   CKTOTAL  21*       Urinalysis:    Lab Results   Component Value Date    NITRU POSITIVE 10/13/2017    WBCUA  10/13/2017    BACTERIA MODERATE 10/13/2017    RBCUA 15-20 10/13/2017    BLOODU LARGE 10/13/2017    SPECGRAV >1.030 10/13/2017    GLUCOSEU NEGATIVE 10/12/2017       Radiology:  XR Abdomen G Tube Placement   Final Result   Esophageal route tube tip in the stomach. **This report has been created using voice recognition software. It may contain minor errors which are inherent in voice recognition technology. **      Final report electronically signed by Dr. Fredi Chatman on 10/13/2017 3:28 PM      XR Chest Single For Ng Tube Placement   Final Result   1. Position of the NG tube is uncertain at this time.  Uncertain if this is actually below the diaphragm or above the diaphragm. 2. A routine mobile view of the chest with standard positioning would be helpful for further evaluation. **This report has been created using voice recognition software. It may contain minor errors which are inherent in voice recognition technology. **      Final report electronically signed by Dr. Elias Bell on 10/13/2017 1:21 PM      XR ABDOMEN (KUB) (SINGLE AP VIEW)   Final Result   1. Findings consistent with mild ileus. 2.  Constipation and possible fecal impaction within the rectum are possible. *This report has been created using voice recognition software. It may contain minor errors which are inherent in voice recognition technology. **      Final report electronically signed by Dr. Tsesa Gambino on 10/13/2017 10:10 AM      CT CHEST WO CONTRAST   Final Result   1. There are patchy infiltrates within the lower lobes bilaterally suggesting a pneumonia. There are also vague radiodensities within the upper chest bilaterally measuring 0.5 cm on the right and 0.6 cm on the left suggesting either focal consolidation    or a lung nodule (series 2 image 16). 2. There is circumferential thickening of the distal esophagus. An esophagram could be performed for additional evaluation. 3. Numerous additional findings as described in the body of the report. **This report has been created using voice recognition software. It may contain minor errors which are inherent in voice recognition technology. **      Final report electronically signed by Dr. Domingo Carney on 10/13/2017 10:04 AM      XR Chest Portable   Final Result   Interstitial edema. Possible developing peribronchial infiltrates at both bases            **This report has been created using voice recognition software. It may contain minor errors which are inherent in voice recognition technology. **      Final report electronically signed by Dr. Pankaj Dutta on 10/12/2017 11:00 PM      US RENAL COMPLETE    (Results from pneumonia, aspiration, continue oxygen supplementation    Hyperkalemia- improving with Late, trended down from 6.6-5.4    Suspicion for ileus- had several bowel movements, sluggish bowel sounds, continue Dulcolax rectal suppository and also start NG tube feeding    Lactic acidosis- still little high, continue IV hydration and antibiotics      Metabolic acidosis- Stable    Hypocalcemia      Elevated troponin- Unclear clinical significance    Coffee-ground emesis?   Gastris vs bleed, continue Protonix, monitor hemoglobin      Alzheimer's disease with behavioral disturbance- chronic,    History of stroke ×2, more than 10 years ago, foot drop on the left    COPD- bronchodilators  GERD- PPI  Sleep apnea  Hypothyroidism- TSH is little elevated    HX of atrial occulusion s/p right AKA    Benign prostate hypertrophy- history of suprapubic catheter- Currently Foleys placed           Electronically signed by Maria Fernanda Tripp MD on 10/14/2017 at 7:33 AM

## 2017-10-14 NOTE — FLOWSHEET NOTE
2000  Just finished report, did patient check and just left patient's room when sitter called to bedside as patient was hitting sitter=Belinda as he had also a hold on the N/G. Bilateral wrist restraints applied and patient calmed down. N/G resecured at nose with small opsite, then also applied medium opsite to left cheek securing n/g to his left cheek. Patient seems to have spasms of awakening/jerking/pulling at anything he can touch. Attempted to have conversation with patient, but as soon as he's awake, he's right back to sleep. When he's awake, he's yelling. Patient then bathed, turned side to side & patient had large soft brown bm. Entire perineum excoriated/reddened/peeling. Sensicare cream applied to tommy-rectal area. Will leave rest clean until can get order for Desenex powder.

## 2017-10-14 NOTE — FLOWSHEET NOTE
1800 Mr Adelaide Guzman was assisted up to a chair. He tolerates the transfer poorly, with max assist but he appears to be very comfortable up in the chair.

## 2017-10-14 NOTE — PLAN OF CARE
Problem: RESPIRATORY  Goal: Normal spontaneous ventilation    Intervention: Noninvasive ventilation initiation  Pt started on BiPAP. Pt tolerating well at this time. Continuing to wean O2 as tolerated. Will continue to monitor.

## 2017-10-14 NOTE — PLAN OF CARE
Problem: RESPIRATORY  Goal: Normal spontaneous ventilation  Outcome: Ongoing  Patient clear/diminished post treatment; will continue as ordered.

## 2017-10-15 ENCOUNTER — APPOINTMENT (OUTPATIENT)
Dept: GENERAL RADIOLOGY | Age: 77
DRG: 871 | End: 2017-10-15
Payer: MEDICARE

## 2017-10-15 LAB
ANION GAP SERPL CALCULATED.3IONS-SCNC: 11 MEQ/L (ref 8–16)
ANION GAP SERPL CALCULATED.3IONS-SCNC: 12 MEQ/L (ref 8–16)
ANISOCYTOSIS: ABNORMAL
BANDED NEUTROPHILS ABSOLUTE COUNT: 4.8 THOU/MM3
BANDS PRESENT: 17 %
BASOPHILIA: SLIGHT
BASOPHILS # BLD: 0 %
BASOPHILS ABSOLUTE: 0 THOU/MM3 (ref 0–0.1)
BUN BLDV-MCNC: 42 MG/DL (ref 7–22)
BUN BLDV-MCNC: 44 MG/DL (ref 7–22)
C. DIFFICILE 027 NAP1 BI: POSITIVE
CALCIUM IONIZED: 1.01 MMOL/L (ref 1.12–1.32)
CALCIUM SERPL-MCNC: 6.9 MG/DL (ref 8.5–10.5)
CALCIUM SERPL-MCNC: 7 MG/DL (ref 8.5–10.5)
CHLORIDE BLD-SCNC: 113 MEQ/L (ref 98–111)
CHLORIDE BLD-SCNC: 115 MEQ/L (ref 98–111)
CLOSTRIDIUM DIFFICILE, PCR: POSITIVE
CO2: 18 MEQ/L (ref 23–33)
CO2: 18 MEQ/L (ref 23–33)
CREAT SERPL-MCNC: 1.3 MG/DL (ref 0.4–1.2)
CREAT SERPL-MCNC: 1.7 MG/DL (ref 0.4–1.2)
DIFFERENTIAL, MANUAL: NORMAL
EOSINOPHIL # BLD: 0 %
EOSINOPHILS ABSOLUTE: 0 THOU/MM3 (ref 0–0.4)
GFR SERPL CREATININE-BSD FRML MDRD: 39 ML/MIN/1.73M2
GFR SERPL CREATININE-BSD FRML MDRD: 53 ML/MIN/1.73M2
GLUCOSE BLD-MCNC: 135 MG/DL (ref 70–108)
GLUCOSE BLD-MCNC: 144 MG/DL (ref 70–108)
HCT VFR BLD CALC: 32.2 % (ref 42–52)
HEMOGLOBIN: 10.2 GM/DL (ref 14–18)
INR BLD: 1.16 (ref 0.85–1.13)
LACTIC ACID: 2.4 MMOL/L (ref 0.5–2.2)
LYMPHOCYTES # BLD: 7 %
LYMPHOCYTES ABSOLUTE: 2 THOU/MM3 (ref 1–4.8)
MAGNESIUM: 2 MG/DL (ref 1.6–2.4)
MCH RBC QN AUTO: 28.4 PG (ref 27–31)
MCHC RBC AUTO-ENTMCNC: 31.8 GM/DL (ref 33–37)
MCV RBC AUTO: 89.4 FL (ref 80–94)
METAMYELOCYTES: 2 %
MONOCYTES # BLD: 3 %
MONOCYTES ABSOLUTE: 0.8 THOU/MM3 (ref 0.4–1.3)
MRSA SCREEN: NORMAL
MYELOCYTES: 1 %
NUCLEATED RED BLOOD CELLS: 0 /100 WBC
PDW BLD-RTO: 18.3 % (ref 11.5–14.5)
PHOSPHORUS: 3 MG/DL (ref 2.4–4.7)
PLATELET # BLD: 229 THOU/MM3 (ref 130–400)
PLATELET ESTIMATE: ADEQUATE
PMV BLD AUTO: 9.7 MCM (ref 7.4–10.4)
POIKILOCYTES: SLIGHT
POTASSIUM SERPL-SCNC: 3.3 MEQ/L (ref 3.5–5.2)
POTASSIUM SERPL-SCNC: 3.5 MEQ/L (ref 3.5–5.2)
PROCALCITONIN: 13.54 NG/ML (ref 0.01–0.09)
RBC # BLD: 3.6 MILL/MM3 (ref 4.7–6.1)
RBC # BLD: NORMAL 10*6/UL
SEG NEUTROPHILS: 70 %
SEGMENTED NEUTROPHILS ABSOLUTE COUNT: 19.8 THOU/MM3 (ref 1.8–7.7)
SODIUM BLD-SCNC: 143 MEQ/L (ref 135–145)
SODIUM BLD-SCNC: 144 MEQ/L (ref 135–145)
STREP PNEUMO AG, UR: NEGATIVE
WBC # BLD: 28.3 THOU/MM3 (ref 4.8–10.8)

## 2017-10-15 PROCEDURE — 71010 XR CHEST PORTABLE: CPT

## 2017-10-15 PROCEDURE — 84145 PROCALCITONIN (PCT): CPT

## 2017-10-15 PROCEDURE — C9113 INJ PANTOPRAZOLE SODIUM, VIA: HCPCS | Performed by: ANESTHESIOLOGY

## 2017-10-15 PROCEDURE — 36415 COLL VENOUS BLD VENIPUNCTURE: CPT

## 2017-10-15 PROCEDURE — 94669 MECHANICAL CHEST WALL OSCILL: CPT

## 2017-10-15 PROCEDURE — 99221 1ST HOSP IP/OBS SF/LOW 40: CPT | Performed by: INTERNAL MEDICINE

## 2017-10-15 PROCEDURE — 2580000003 HC RX 258: Performed by: INTERNAL MEDICINE

## 2017-10-15 PROCEDURE — 85025 COMPLETE CBC W/AUTO DIFF WBC: CPT

## 2017-10-15 PROCEDURE — 99232 SBSQ HOSP IP/OBS MODERATE 35: CPT | Performed by: INTERNAL MEDICINE

## 2017-10-15 PROCEDURE — 6370000000 HC RX 637 (ALT 250 FOR IP): Performed by: ANESTHESIOLOGY

## 2017-10-15 PROCEDURE — 82330 ASSAY OF CALCIUM: CPT

## 2017-10-15 PROCEDURE — 80048 BASIC METABOLIC PNL TOTAL CA: CPT

## 2017-10-15 PROCEDURE — 83605 ASSAY OF LACTIC ACID: CPT

## 2017-10-15 PROCEDURE — A4421 OSTOMY SUPPLY MISC: HCPCS

## 2017-10-15 PROCEDURE — 94640 AIRWAY INHALATION TREATMENT: CPT

## 2017-10-15 PROCEDURE — 2500000003 HC RX 250 WO HCPCS: Performed by: INTERNAL MEDICINE

## 2017-10-15 PROCEDURE — 2100000000 HC CCU R&B

## 2017-10-15 PROCEDURE — 94660 CPAP INITIATION&MGMT: CPT

## 2017-10-15 PROCEDURE — 6360000002 HC RX W HCPCS: Performed by: ANESTHESIOLOGY

## 2017-10-15 PROCEDURE — 6370000000 HC RX 637 (ALT 250 FOR IP): Performed by: INTERNAL MEDICINE

## 2017-10-15 PROCEDURE — 83735 ASSAY OF MAGNESIUM: CPT

## 2017-10-15 PROCEDURE — 84100 ASSAY OF PHOSPHORUS: CPT

## 2017-10-15 PROCEDURE — 6360000002 HC RX W HCPCS: Performed by: INTERNAL MEDICINE

## 2017-10-15 PROCEDURE — 2700000000 HC OXYGEN THERAPY PER DAY

## 2017-10-15 PROCEDURE — 85610 PROTHROMBIN TIME: CPT

## 2017-10-15 PROCEDURE — 87493 C DIFF AMPLIFIED PROBE: CPT

## 2017-10-15 PROCEDURE — 2500000003 HC RX 250 WO HCPCS: Performed by: NURSE PRACTITIONER

## 2017-10-15 PROCEDURE — 2720000010 HC SURG SUPPLY STERILE

## 2017-10-15 PROCEDURE — 2580000003 HC RX 258: Performed by: ANESTHESIOLOGY

## 2017-10-15 RX ORDER — ACETAMINOPHEN 160 MG/5ML
650 SOLUTION ORAL EVERY 4 HOURS PRN
Status: DISCONTINUED | OUTPATIENT
Start: 2017-10-15 | End: 2017-10-18 | Stop reason: HOSPADM

## 2017-10-15 RX ORDER — POTASSIUM CHLORIDE 7.45 MG/ML
10 INJECTION INTRAVENOUS PRN
Status: DISCONTINUED | OUTPATIENT
Start: 2017-10-15 | End: 2017-10-17

## 2017-10-15 RX ORDER — 0.9 % SODIUM CHLORIDE 0.9 %
1000 INTRAVENOUS SOLUTION INTRAVENOUS ONCE
Status: DISCONTINUED | OUTPATIENT
Start: 2017-10-15 | End: 2017-10-18 | Stop reason: HOSPADM

## 2017-10-15 RX ORDER — POTASSIUM CHLORIDE 20 MEQ/1
40 TABLET, EXTENDED RELEASE ORAL PRN
Status: DISCONTINUED | OUTPATIENT
Start: 2017-10-15 | End: 2017-10-17

## 2017-10-15 RX ORDER — FAMOTIDINE 20 MG/1
20 TABLET, FILM COATED ORAL DAILY
Status: DISCONTINUED | OUTPATIENT
Start: 2017-10-15 | End: 2017-10-17

## 2017-10-15 RX ORDER — POTASSIUM CHLORIDE 20MEQ/15ML
40 LIQUID (ML) ORAL PRN
Status: DISCONTINUED | OUTPATIENT
Start: 2017-10-15 | End: 2017-10-17

## 2017-10-15 RX ADMIN — SODIUM CHLORIDE 100 MG: 9 INJECTION, SOLUTION INTRAVENOUS at 12:37

## 2017-10-15 RX ADMIN — IPRATROPIUM BROMIDE AND ALBUTEROL SULFATE 3 ML: .5; 3 SOLUTION RESPIRATORY (INHALATION) at 20:20

## 2017-10-15 RX ADMIN — Medication 250 MG: at 19:08

## 2017-10-15 RX ADMIN — IPRATROPIUM BROMIDE AND ALBUTEROL SULFATE 3 ML: .5; 3 SOLUTION RESPIRATORY (INHALATION) at 07:34

## 2017-10-15 RX ADMIN — LORAZEPAM 1 MG: 1 TABLET ORAL at 23:30

## 2017-10-15 RX ADMIN — ACETAMINOPHEN 650 MG: 650 SOLUTION ORAL at 19:04

## 2017-10-15 RX ADMIN — METOPROLOL TARTRATE 12.5 MG: 25 TABLET ORAL at 21:47

## 2017-10-15 RX ADMIN — TRAZODONE HYDROCHLORIDE 50 MG: 50 TABLET ORAL at 21:47

## 2017-10-15 RX ADMIN — Medication 1 CAPSULE: at 08:09

## 2017-10-15 RX ADMIN — Medication 250 MG: at 12:30

## 2017-10-15 RX ADMIN — SODIUM CHLORIDE: 9 INJECTION, SOLUTION INTRAVENOUS at 03:45

## 2017-10-15 RX ADMIN — CETIRIZINE HYDROCHLORIDE 10 MG: 10 TABLET, FILM COATED ORAL at 08:09

## 2017-10-15 RX ADMIN — POTASSIUM CHLORIDE 40 MEQ: 1.5 SOLUTION ORAL at 05:19

## 2017-10-15 RX ADMIN — MEROPENEM 500 MG: 500 INJECTION, POWDER, FOR SOLUTION INTRAVENOUS at 08:51

## 2017-10-15 RX ADMIN — IPRATROPIUM BROMIDE AND ALBUTEROL SULFATE 3 ML: .5; 3 SOLUTION RESPIRATORY (INHALATION) at 12:18

## 2017-10-15 RX ADMIN — FOLIC ACID 1 MG: 1 TABLET ORAL at 08:09

## 2017-10-15 RX ADMIN — Medication 10 ML: at 08:20

## 2017-10-15 RX ADMIN — SODIUM BICARBONATE: 84 INJECTION, SOLUTION INTRAVENOUS at 10:25

## 2017-10-15 RX ADMIN — Medication 10 ML: at 21:53

## 2017-10-15 RX ADMIN — LORAZEPAM 1 MG: 1 TABLET ORAL at 04:35

## 2017-10-15 RX ADMIN — PANTOPRAZOLE SODIUM 40 MG: 40 INJECTION, POWDER, FOR SOLUTION INTRAVENOUS at 08:09

## 2017-10-15 RX ADMIN — SODIUM BICARBONATE: 84 INJECTION, SOLUTION INTRAVENOUS at 19:45

## 2017-10-15 RX ADMIN — MICONAZOLE NITRATE: 2 POWDER TOPICAL at 21:52

## 2017-10-15 RX ADMIN — FINASTERIDE 5 MG: 5 TABLET, FILM COATED ORAL at 08:09

## 2017-10-15 RX ADMIN — Medication 1 CAPSULE: at 21:47

## 2017-10-15 RX ADMIN — IPRATROPIUM BROMIDE AND ALBUTEROL SULFATE 3 ML: .5; 3 SOLUTION RESPIRATORY (INHALATION) at 15:55

## 2017-10-15 RX ADMIN — Medication 325 MG: at 08:09

## 2017-10-15 RX ADMIN — MICONAZOLE NITRATE: 2 POWDER TOPICAL at 08:30

## 2017-10-15 RX ADMIN — LEVOTHYROXINE SODIUM 100 MCG: 100 TABLET ORAL at 08:09

## 2017-10-15 ASSESSMENT — PAIN SCALES - PAIN ASSESSMENT IN ADVANCED DEMENTIA (PAINAD)
CONSOLABILITY: 1
BODYLANGUAGE: 1
FACIALEXPRESSION: 1
BREATHING: 1
NEGVOCALIZATION: 1
TOTALSCORE: 5

## 2017-10-15 NOTE — PLAN OF CARE
Problem: RESPIRATORY  Goal: Normal spontaneous ventilation  Outcome: Ongoing  Pt not wearing bipap at this time, no skin breakdown noted.

## 2017-10-15 NOTE — FLOWSHEET NOTE
0800 Mr Perry Wallace is in the bed. He remains very confused. He gets agitated at times. He does not pull at his devices, but occassionally gets his hands and arms wrapped up in them. A sitter is at the bedide. The NGT is in place per air bolus. His tube feeding is at goal at 60 ml/hr. He has frequent loose stools now which are almost constant. 0900 He has tested pos for C-Diff. He was placed in contact isolation and a flexacil has been inserted. He tolerated this well. Dr Radha Choudhary has been consulted to see. 1000 Dr Radha Choudhary has come to see. He remains confused in the bed. He has recurring episodes of PAT which lasts about a minute. He appears to be asymptomatic with this.

## 2017-10-15 NOTE — PROGRESS NOTES
changed to meropenem, and once C.  Dif was positive, ABX changed to Tegecycline and vancomycin  PO via NG tube, urine out put is low, nephrology consulted and IV fluids changed to bicarb drip for metabolic acidosis and renal failure, replace potassium, BP still on low side, hemoglobin stable    Subjective: patient is pleasenntly confused  But calm, sitter present, has NG tube, rectal seal, coughing up dark grey colored thick phlegm    Medications:  Reviewed    Infusion Medications    sodium chloride 125 mL/hr at 10/15/17 0345    dextrose       Scheduled Medications    sodium chloride  1,000 mL Intravenous Once    meropenem  500 mg Intravenous Q8H    calcium replacement protocol   Other RX Placeholder    phosphorus replacement protocol   Other RX Placeholder    magnesium replacement protocol   Other RX Placeholder    ferrous sulfate  325 mg Oral Daily with breakfast    finasteride  5 mg Oral Daily    folic acid  1 mg Oral Daily    ipratropium-albuterol  3 mL Inhalation Q4H WA    lactobacillus  1 capsule Oral BID    levothyroxine  100 mcg Oral Daily    cetirizine  10 mg Oral Daily    traZODone  50 mg Oral Nightly    sodium chloride flush  10 mL Intravenous 2 times per day    vancomycin (VANCOCIN) intermittent dosing (placeholder)   Other RX Placeholder    pantoprazole  40 mg Intravenous Daily    dextrose  25 g Intravenous Once    insulin regular  8 Units Intravenous Once    bisacodyl  10 mg Rectal Daily    vancomycin  1,000 mg Intravenous Q24H    miconazole   Topical BID     PRN Meds: potassium chloride **OR** potassium chloride **OR** potassium chloride, potassium chloride, bisacodyl, LORazepam, nitroGLYCERIN, oxyCODONE, sodium chloride flush, magnesium hydroxide, ondansetron, glucose, dextrose, glucagon (rDNA), dextrose, acetaminophen      Intake/Output Summary (Last 24 hours) at 10/15/17 0718  Last data filed at 10/15/17 0600   Gross per 24 hour   Intake          4262.07 ml   Output 395 ml   Net          3867.07 ml       Diet:  DIET TUBE FEED CONTINUOUS/CYCLIC NPO; Renal Formula; Nasogastric; Continuous; 20; 60; 40    Exam:  BP (!) 87/48   Pulse 101   Temp 99.7 °F (37.6 °C) (Core)   Resp 29   Ht 5' 4\" (1.626 m)   Wt 152 lb 13.9 oz (69.3 kg)   SpO2 97%   BMI 26.24 kg/m²     Physical Examination:   General appearance - alert, awake  Has cough, in no distress, pleasantly confused, NG tube, oxygen  HEENT: Normocephalic, Atraumatic, pupils reactive, mucous membranes little dry  Chest - moving equally to respiration,symmetric air entry, still has rhonchi bilateral on auscultation,  Heart - tachycardiac, regular rhythm, normal S1, S2, no murmurs  Abdomen - soft, nontender, nondistended, no masses or organomegaly, BS- sluggish+  Neurological - alert, pleasantly confused , normal speech, sensations intact and able to move all extremities, right AKA+   Extremities - peripheral pulses 1+, no pedal edema,  Capillary refill less than 3 sec  Skin - normal coloration and turgor, no rashes,   Foleys+        Labs:   Recent Labs      10/13/17   0603  10/14/17   0419  10/15/17   0330   WBC  24.4*  21.6*  28.3*   HGB  12.0*  10.6*  10.2*   HCT  37.8*  33.4*  32.2*   PLT  301  242  229     Recent Labs      10/13/17   0510  10/13/17   1718  10/14/17   0419  10/15/17   0330   NA  139  139  143  143   K  6.6*  5.6*  5.4*  3.3*   CL  99  102  107  113*   CO2  21*  22*  20*  18*   BUN  29*  38*  43*  44*   CREATININE  1.8*  2.0*  1.9*  1.7*   CALCIUM  8.3*  8.4*  8.0*  7.0*   PHOS  4.5   --   3.8  3.0     Recent Labs      10/12/17   2225  10/14/17   0419   AST  18  22   ALT  15  14   BILIDIR  <0.2  <0.2   BILITOT  0.6  0.5   ALKPHOS  118  98     Recent Labs      10/13/17   0510  10/14/17   0419  10/15/17   0330   INR  1.35*  1.31*  1.16*     Recent Labs      10/12/17   2225   CKTOTAL  21*       Urinalysis:      Lab Results   Component Value Date    NITRU POSITIVE 10/13/2017    WBCUA  10/13/2017 BACTERIA MODERATE 10/13/2017    RBCUA 15-20 10/13/2017    BLOODU LARGE 10/13/2017    SPECGRAV >1.030 10/13/2017    GLUCOSEU NEGATIVE 10/12/2017       Radiology:  XR ABDOMEN (KUB) (SINGLE AP VIEW)   Final Result   Prominent gas-filled large and small bowel loops with distal bowel gas consistent with improving paralytic ileus. Final report electronically signed by Dr. Charmayne Beverage on 10/14/2017 9:07 AM      XR Chest Portable   Final Result   Slightly worsening bibasilar atelectasis/infiltrate. **This report has been created using voice recognition software. It may contain minor errors which are inherent in voice recognition technology. **      Final report electronically signed by Dr. Charmayne Beverage on 10/14/2017 8:43 AM      US RENAL COMPLETE   Final Result   1. Possible nonobstructive right-sided nephrolithiasis or prominent renal sinus fat. 2. Probable left renal cyst.   3. Conte catheter in a nondistended urinary bladder. Final report electronically signed by Dr. Charmayne Beverage on 10/14/2017 8:31 AM      XR Abdomen G Tube Placement   Final Result   Esophageal route tube tip in the stomach. **This report has been created using voice recognition software. It may contain minor errors which are inherent in voice recognition technology. **      Final report electronically signed by Dr. Zamzam Godwin on 10/13/2017 3:28 PM      XR Chest Single For Ng Tube Placement   Final Result   1. Position of the NG tube is uncertain at this time. Uncertain if this is actually below the diaphragm or above the diaphragm. 2. A routine mobile view of the chest with standard positioning would be helpful for further evaluation. **This report has been created using voice recognition software. It may contain minor errors which are inherent in voice recognition technology. **      Final report electronically signed by Dr. Poppy Wong on 10/13/2017 1:21 PM      XR ABDOMEN (KUB) (SINGLE AP VIEW)   Final Result   1. Findings consistent with mild ileus. 2.  Constipation and possible fecal impaction within the rectum are possible. *This report has been created using voice recognition software. It may contain minor errors which are inherent in voice recognition technology. **      Final report electronically signed by Dr. Meryl Woods on 10/13/2017 10:10 AM      CT CHEST WO CONTRAST   Final Result   1. There are patchy infiltrates within the lower lobes bilaterally suggesting a pneumonia. There are also vague radiodensities within the upper chest bilaterally measuring 0.5 cm on the right and 0.6 cm on the left suggesting either focal consolidation    or a lung nodule (series 2 image 16). 2. There is circumferential thickening of the distal esophagus. An esophagram could be performed for additional evaluation. 3. Numerous additional findings as described in the body of the report. **This report has been created using voice recognition software. It may contain minor errors which are inherent in voice recognition technology. **      Final report electronically signed by Dr. Susan Bowles on 10/13/2017 10:04 AM      XR Chest Portable   Final Result   Interstitial edema. Possible developing peribronchial infiltrates at both bases            **This report has been created using voice recognition software. It may contain minor errors which are inherent in voice recognition technology. **      Final report electronically signed by Dr. India Erickson on 10/12/2017 11:00 PM      XR Chest Portable    (Results Pending)       Diet: DIET TUBE FEED CONTINUOUS/CYCLIC NPO; Renal Formula; Nasogastric; Continuous; 20; 60; 40    DVT prophylaxis: [] Lovenox                                 [x] SCDs                                 [] SQ Heparin                                 [] Encourage ambulation           [] Already on Anticoagulation     Disposition:    [] Home       [] TCU       [] Rehab       [] Psych

## 2017-10-15 NOTE — FLOWSHEET NOTE
10/15/17 0202   Vitals   Temp 99.9 °F (37.7 °C)   Temp Source CORE   Pulse 103   Resp (!) 31   BP (!) 105/58   MAP (mmHg) 72   Oxygen Therapy   SpO2 100 %   O2 Device Bi-PAP  (11/7; fine crackles post lower lobes; N/S rate dec to 20ml/h)   Patient incontinent of medium bm; turned side to side to clean & change linen; this time patient more dyspneic with turning although O2 sats were still 97% on room air, but patient definitely working to breathe and stated he didn't feel good. Posterior lung sounds showed crackles bilateral lower lobes so bipap placed back on with 11/7 with FIO2 25%. Patient settled down & rested. N/g checked for residual and there was none.

## 2017-10-15 NOTE — CONSULTS
or jvd noted  Cardiovascular:  Regular sinus rhythm without any murmur or rubs  Respiratory: Clear to auscultation without any wheezes, rhonchi or rales  Abdomen: Abdomen is soft. He has good bowel sounds. It is mildly tender to deep pressure and deep palpation without any rebound. No rigidity. Ext: No lower extremity edema  Psychiatric: Deferred  Musculoskeletal:  Intact  CNS: Very nonviable and did not respond to commands    Data:   Labs:  Lab Results   Component Value Date     10/15/2017     10/14/2017     10/13/2017    K 3.3 (L) 10/15/2017    K 5.4 (H) 10/14/2017    K 5.6 (H) 10/13/2017     (H) 10/15/2017    CO2 18 (L) 10/15/2017    CO2 20 (L) 10/14/2017    CO2 22 (L) 10/13/2017    CREATININE 1.7 (H) 10/15/2017    CREATININE 1.9 (H) 10/14/2017    CREATININE 2.0 (H) 10/13/2017    BUN 44 (H) 10/15/2017    BUN 43 (H) 10/14/2017    BUN 38 (H) 10/13/2017    GLUCOSE 144 (H) 10/15/2017    GLUCOSE 107 10/14/2017    GLUCOSE 123 (H) 10/13/2017    PHOS 3.0 10/15/2017    PHOS 3.8 10/14/2017    PHOS 4.5 10/13/2017    WBC 28.3 (HH) 10/15/2017    WBC 21.6 (H) 10/14/2017    WBC 24.4 (H) 10/13/2017    HGB 10.2 (L) 10/15/2017    HGB 10.6 (L) 10/14/2017    HGB 12.0 (L) 10/13/2017    HCT 32.2 (L) 10/15/2017    HCT 33.4 (L) 10/14/2017    HCT 37.8 (L) 10/13/2017    MCV 89.4 10/15/2017     10/15/2017     Labs reviewed    Imaging:  CXR results:         Thank you Dr. Mina Duque MD for allowing us to participate in care of Nathalie La       Electronically signed by Michael Torrez MD on 10/15/2017 at 4:02 PM

## 2017-10-15 NOTE — FLOWSHEET NOTE
1500 Mr Sindy Rosenberg continues to rest in the bed. He is less anxious this afternoon, but he seems to have greater confusion. His wife has come to see.

## 2017-10-15 NOTE — FLOWSHEET NOTE
10/14/17 2204   Vitals   Pulse 111   Resp (!) 32   Oxygen Therapy   SpO2 100 %   O2 Device Bi-PAP  (11/7)   Skin Protection for O2 Device Yes  (gecko in place)   FiO2  25 %   Pain Assessment   Pain Level 0   When turned, noted fine crackles throughout; bipap placed 11/7 with 25% FIO2; patient tolerated and settled down as he had been calling out & restless. Now resting more. O2 sats remain good both prior and after Bipap placed.

## 2017-10-15 NOTE — FLOWSHEET NOTE
10/15/17 0006   Vitals   Pulse 105   Resp (!) 31   /68   MAP (mmHg) 86   BP Location Right upper arm   BP Upper/Lower Upper   BP Method Automatic   Patient Position Supine;Semi fowlers   Level of Consciousness 1   Patient Currently in Pain Other (comment)   Cardiac Rhythm ST   Oxygen Therapy   SpO2 98 %   Pulse Oximeter Device Mode Continuous   Pulse Oximeter Device Location Other(comment)  (left ear lobe)   O2 Device Nasal cannula   Patient repositioned up & to right side after bipap removed & O2 placed at 2lpm.  Lung sounds are now clear and will continue to monitor.

## 2017-10-15 NOTE — FLOWSHEET NOTE
1200 Dr Sami Hussein has been notified of the abx change and the C-Diff. Orders received for this. He appears to be much less agitated today. He continues to have a sitter in the room.

## 2017-10-15 NOTE — CONSULTS
Seen and examined   Seeing for acute kidney injury and metabolic acidosis   Change IV fluid to 0.45 saline with 75 meq sodium bicarb at 125 ml/hr   See order   Full consult to follow

## 2017-10-15 NOTE — PLAN OF CARE
Problem: Falls - Risk of  Goal: Absence of falls  Outcome: Ongoing  Patient remains confused, but not combative tonight; still has panic episodes, but less tonight. Sitter remains at bedside as patient still pulls/reaches for tubings, etc, but does so less tonight    Problem: Nutrition  Goal: Optimal nutrition therapy  Outcome: Met This Shift  Tube feed started earlier yesterday as n/g drainage changed to clear pale green; goal of 60ml/hr was reached at midnight without any residual obtained. Problem: Discharge Planning:  Goal: Discharged to appropriate level of care  Discharged to appropriate level of care   Outcome: Ongoing  According to prior RN's report, patient's wife is realistic that patient won't be able to return home and ECF care will be needed. Problem: Gas Exchange - Impaired:  Goal: Levels of oxygenation will improve  Levels of oxygenation will improve   Outcome: Ongoing  Even though urine output has improved, we're giving tube feed of Nepro at goal of 60ml/hr without residual and several liquid bms, we're still having problem with occurrence of crackles. Bipap has been effective in aiding patient's breathing.

## 2017-10-16 LAB
ANION GAP SERPL CALCULATED.3IONS-SCNC: 11 MEQ/L (ref 8–16)
ANISOCYTOSIS: ABNORMAL
BANDED NEUTROPHILS ABSOLUTE COUNT: 1.7 THOU/MM3
BANDS PRESENT: 5 %
BASOPHILIA: SLIGHT
BASOPHILS # BLD: 0 %
BASOPHILS ABSOLUTE: 0 THOU/MM3 (ref 0–0.1)
BUN BLDV-MCNC: 50 MG/DL (ref 7–22)
CALCIUM IONIZED: 1.08 MMOL/L (ref 1.12–1.32)
CALCIUM SERPL-MCNC: 6.9 MG/DL (ref 8.5–10.5)
CHLORIDE BLD-SCNC: 113 MEQ/L (ref 98–111)
CO2: 24 MEQ/L (ref 23–33)
CREAT SERPL-MCNC: 1.2 MG/DL (ref 0.4–1.2)
DIFFERENTIAL TYPE: ABNORMAL
DIFFERENTIAL, MANUAL: NORMAL
EOSINOPHIL # BLD: 0 %
EOSINOPHILS ABSOLUTE: 0 THOU/MM3 (ref 0–0.4)
GFR SERPL CREATININE-BSD FRML MDRD: 58 ML/MIN/1.73M2
GLUCOSE BLD-MCNC: 127 MG/DL (ref 70–108)
HCT VFR BLD CALC: 34 % (ref 42–52)
HEMOGLOBIN: 10.8 GM/DL (ref 14–18)
INR BLD: 1.21 (ref 0.85–1.13)
LACTIC ACID: 1.8 MMOL/L (ref 0.5–2.2)
LYMPHOCYTES # BLD: 5 %
LYMPHOCYTES ABSOLUTE: 1.7 THOU/MM3 (ref 1–4.8)
MAGNESIUM: 2.1 MG/DL (ref 1.6–2.4)
MCH RBC QN AUTO: 28.6 PG (ref 27–31)
MCHC RBC AUTO-ENTMCNC: 31.9 GM/DL (ref 33–37)
MCV RBC AUTO: 89.6 FL (ref 80–94)
MONOCYTES # BLD: 1 %
MONOCYTES ABSOLUTE: 0.3 THOU/MM3 (ref 0.4–1.3)
MYELOCYTES: 6 %
NUCLEATED RED BLOOD CELLS: 1 /100 WBC
OVALOCYTES: ABNORMAL
PATHOLOGIST REVIEW: ABNORMAL
PDW BLD-RTO: 17.9 % (ref 11.5–14.5)
PHOSPHORUS: 1.6 MG/DL (ref 2.4–4.7)
PLATELET # BLD: 240 THOU/MM3 (ref 130–400)
PLATELET ESTIMATE: ADEQUATE
PMV BLD AUTO: 9.9 MCM (ref 7.4–10.4)
POIKILOCYTES: SLIGHT
POTASSIUM SERPL-SCNC: 3.5 MEQ/L (ref 3.5–5.2)
PROMYELOCYTES: 2 %
RBC # BLD: 3.79 MILL/MM3 (ref 4.7–6.1)
RBC # BLD: ABNORMAL 10*6/UL
SEG NEUTROPHILS: 81 %
SEGMENTED NEUTROPHILS ABSOLUTE COUNT: 28 THOU/MM3 (ref 1.8–7.7)
SODIUM BLD-SCNC: 148 MEQ/L (ref 135–145)
TOXIC GRANULATION: SLIGHT
WBC # BLD: 34.6 THOU/MM3 (ref 4.8–10.8)

## 2017-10-16 PROCEDURE — 82330 ASSAY OF CALCIUM: CPT

## 2017-10-16 PROCEDURE — 6370000000 HC RX 637 (ALT 250 FOR IP): Performed by: INTERNAL MEDICINE

## 2017-10-16 PROCEDURE — 99232 SBSQ HOSP IP/OBS MODERATE 35: CPT | Performed by: INTERNAL MEDICINE

## 2017-10-16 PROCEDURE — 2580000003 HC RX 258: Performed by: INTERNAL MEDICINE

## 2017-10-16 PROCEDURE — 80048 BASIC METABOLIC PNL TOTAL CA: CPT

## 2017-10-16 PROCEDURE — 6360000002 HC RX W HCPCS: Performed by: INTERNAL MEDICINE

## 2017-10-16 PROCEDURE — 94669 MECHANICAL CHEST WALL OSCILL: CPT

## 2017-10-16 PROCEDURE — 84100 ASSAY OF PHOSPHORUS: CPT

## 2017-10-16 PROCEDURE — 94640 AIRWAY INHALATION TREATMENT: CPT

## 2017-10-16 PROCEDURE — 2500000003 HC RX 250 WO HCPCS: Performed by: INTERNAL MEDICINE

## 2017-10-16 PROCEDURE — 36415 COLL VENOUS BLD VENIPUNCTURE: CPT

## 2017-10-16 PROCEDURE — 6370000000 HC RX 637 (ALT 250 FOR IP): Performed by: ANESTHESIOLOGY

## 2017-10-16 PROCEDURE — 83735 ASSAY OF MAGNESIUM: CPT

## 2017-10-16 PROCEDURE — 83605 ASSAY OF LACTIC ACID: CPT

## 2017-10-16 PROCEDURE — 92610 EVALUATE SWALLOWING FUNCTION: CPT

## 2017-10-16 PROCEDURE — 85025 COMPLETE CBC W/AUTO DIFF WBC: CPT

## 2017-10-16 PROCEDURE — 2580000003 HC RX 258: Performed by: ANESTHESIOLOGY

## 2017-10-16 PROCEDURE — A6212 FOAM DRG <=16 SQ IN W/BORDER: HCPCS

## 2017-10-16 PROCEDURE — 2100000000 HC CCU R&B

## 2017-10-16 PROCEDURE — 85610 PROTHROMBIN TIME: CPT

## 2017-10-16 RX ORDER — MIDODRINE HYDROCHLORIDE 5 MG/1
5 TABLET ORAL
Status: DISCONTINUED | OUTPATIENT
Start: 2017-10-16 | End: 2017-10-17

## 2017-10-16 RX ORDER — DEXTROSE MONOHYDRATE 50 MG/ML
INJECTION, SOLUTION INTRAVENOUS CONTINUOUS
Status: DISCONTINUED | OUTPATIENT
Start: 2017-10-16 | End: 2017-10-17

## 2017-10-16 RX ORDER — POTASSIUM CHLORIDE 20MEQ/15ML
40 LIQUID (ML) ORAL ONCE
Status: COMPLETED | OUTPATIENT
Start: 2017-10-16 | End: 2017-10-16

## 2017-10-16 RX ADMIN — LEVOTHYROXINE SODIUM 100 MCG: 100 TABLET ORAL at 06:29

## 2017-10-16 RX ADMIN — IPRATROPIUM BROMIDE AND ALBUTEROL SULFATE 3 ML: .5; 3 SOLUTION RESPIRATORY (INHALATION) at 07:18

## 2017-10-16 RX ADMIN — METOPROLOL TARTRATE 12.5 MG: 25 TABLET ORAL at 22:14

## 2017-10-16 RX ADMIN — METRONIDAZOLE 500 MG: 500 INJECTION, SOLUTION INTRAVENOUS at 13:30

## 2017-10-16 RX ADMIN — POTASSIUM CHLORIDE 40 MEQ: 1.5 SOLUTION ORAL at 10:23

## 2017-10-16 RX ADMIN — IPRATROPIUM BROMIDE AND ALBUTEROL SULFATE 3 ML: .5; 3 SOLUTION RESPIRATORY (INHALATION) at 15:35

## 2017-10-16 RX ADMIN — Medication 250 MG: at 01:25

## 2017-10-16 RX ADMIN — MIDODRINE HYDROCHLORIDE 5 MG: 5 TABLET ORAL at 12:03

## 2017-10-16 RX ADMIN — MIDODRINE HYDROCHLORIDE 5 MG: 5 TABLET ORAL at 16:31

## 2017-10-16 RX ADMIN — FINASTERIDE 5 MG: 5 TABLET, FILM COATED ORAL at 09:40

## 2017-10-16 RX ADMIN — Medication 250 MG: at 06:53

## 2017-10-16 RX ADMIN — Medication 325 MG: at 09:40

## 2017-10-16 RX ADMIN — METOPROLOL TARTRATE 12.5 MG: 25 TABLET ORAL at 12:04

## 2017-10-16 RX ADMIN — MIDODRINE HYDROCHLORIDE 5 MG: 5 TABLET ORAL at 08:19

## 2017-10-16 RX ADMIN — METRONIDAZOLE 500 MG: 500 INJECTION, SOLUTION INTRAVENOUS at 21:17

## 2017-10-16 RX ADMIN — POTASSIUM CHLORIDE 40 MEQ: 1.5 SOLUTION ORAL at 06:53

## 2017-10-16 RX ADMIN — ACETAMINOPHEN 650 MG: 650 SOLUTION ORAL at 04:39

## 2017-10-16 RX ADMIN — MICONAZOLE NITRATE: 2 POWDER TOPICAL at 09:45

## 2017-10-16 RX ADMIN — IPRATROPIUM BROMIDE AND ALBUTEROL SULFATE 3 ML: .5; 3 SOLUTION RESPIRATORY (INHALATION) at 11:06

## 2017-10-16 RX ADMIN — POTASSIUM PHOSPHATE, MONOBASIC AND POTASSIUM PHOSPHATE, DIBASIC 12 MMOL: 224; 236 INJECTION, SOLUTION, CONCENTRATE INTRAVENOUS at 08:14

## 2017-10-16 RX ADMIN — LORAZEPAM 1 MG: 1 TABLET ORAL at 12:32

## 2017-10-16 RX ADMIN — DEXTROSE MONOHYDRATE: 50 INJECTION, SOLUTION INTRAVENOUS at 19:35

## 2017-10-16 RX ADMIN — Medication 1 CAPSULE: at 09:40

## 2017-10-16 RX ADMIN — FOLIC ACID 1 MG: 1 TABLET ORAL at 09:39

## 2017-10-16 RX ADMIN — SODIUM CHLORIDE 50 MG: 9 INJECTION, SOLUTION INTRAVENOUS at 00:11

## 2017-10-16 RX ADMIN — FAMOTIDINE 20 MG: 20 TABLET, FILM COATED ORAL at 09:39

## 2017-10-16 RX ADMIN — ACETAMINOPHEN 650 MG: 650 SOLUTION ORAL at 18:53

## 2017-10-16 RX ADMIN — Medication 10 ML: at 22:12

## 2017-10-16 RX ADMIN — MICONAZOLE NITRATE: 2 POWDER TOPICAL at 21:15

## 2017-10-16 RX ADMIN — DEXTROSE MONOHYDRATE: 50 INJECTION, SOLUTION INTRAVENOUS at 06:36

## 2017-10-16 RX ADMIN — SODIUM CHLORIDE 50 MG: 9 INJECTION, SOLUTION INTRAVENOUS at 12:05

## 2017-10-16 RX ADMIN — IPRATROPIUM BROMIDE AND ALBUTEROL SULFATE 3 ML: .5; 3 SOLUTION RESPIRATORY (INHALATION) at 19:59

## 2017-10-16 RX ADMIN — Medication 250 MG: at 18:07

## 2017-10-16 RX ADMIN — Medication 250 MG: at 12:04

## 2017-10-16 RX ADMIN — Medication 1 CAPSULE: at 22:14

## 2017-10-16 RX ADMIN — SODIUM BICARBONATE: 84 INJECTION, SOLUTION INTRAVENOUS at 05:23

## 2017-10-16 ASSESSMENT — PAIN SCALES - PAIN ASSESSMENT IN ADVANCED DEMENTIA (PAINAD)
CONSOLABILITY: 0
TOTALSCORE: 2
FACIALEXPRESSION: 0
BREATHING: 1
BREATHING: 1
BODYLANGUAGE: 0
NEGVOCALIZATION: 1
BREATHING: 1
FACIALEXPRESSION: 0
CONSOLABILITY: 0
TOTALSCORE: 2
FACIALEXPRESSION: 0
FACIALEXPRESSION: 0
TOTALSCORE: 2
BREATHING: 1
BREATHING: 1
CONSOLABILITY: 0
BODYLANGUAGE: 0
NEGVOCALIZATION: 1
BREATHING: 1
FACIALEXPRESSION: 0
NEGVOCALIZATION: 1
FACIALEXPRESSION: 0
NEGVOCALIZATION: 1
BREATHING: 1
TOTALSCORE: 2
NEGVOCALIZATION: 1
BODYLANGUAGE: 0
FACIALEXPRESSION: 0
BREATHING: 1
TOTALSCORE: 2
BODYLANGUAGE: 0
NEGVOCALIZATION: 1
BREATHING: 1
TOTALSCORE: 2
TOTALSCORE: 2
CONSOLABILITY: 0
FACIALEXPRESSION: 0
BREATHING: 1
TOTALSCORE: 2
FACIALEXPRESSION: 0
FACIALEXPRESSION: 0
CONSOLABILITY: 0
TOTALSCORE: 2
NEGVOCALIZATION: 1
NEGVOCALIZATION: 1
TOTALSCORE: 2
NEGVOCALIZATION: 1
BODYLANGUAGE: 0
BREATHING: 1
NEGVOCALIZATION: 1
NEGVOCALIZATION: 1
BREATHING: 1
CONSOLABILITY: 0
BODYLANGUAGE: 0
CONSOLABILITY: 0
NEGVOCALIZATION: 1
TOTALSCORE: 2
TOTALSCORE: 2
BODYLANGUAGE: 0
BODYLANGUAGE: 0
FACIALEXPRESSION: 0
BODYLANGUAGE: 0
BODYLANGUAGE: 0
CONSOLABILITY: 0
FACIALEXPRESSION: 0
CONSOLABILITY: 0
CONSOLABILITY: 0
BODYLANGUAGE: 0
BODYLANGUAGE: 0

## 2017-10-16 ASSESSMENT — PAIN SCALES - WONG BAKER
WONGBAKER_NUMERICALRESPONSE: 0

## 2017-10-16 ASSESSMENT — PAIN SCALES - GENERAL: PAINLEVEL_OUTOF10: 0

## 2017-10-16 NOTE — CARE COORDINATION
10/16/17, 11:38 AM    DISCHARGE BARRIERS    Patient is from [facility]. SW spoke with spouse and they plan for Patient to return to [facility] at discharge. SW spoke with [facility] Admissions and Patient was skilled medicare and they are planning on Patient returning at discharge.      Electronically signed by ATUL Cheek LSW on 10/16/17 at 11:39 AM

## 2017-10-16 NOTE — PROGRESS NOTES
Hospitalist Progress Note    [facility]      Unit/Bed:3A-01/001-A    YOB: 1940    MRN: [id number]       Acct: [de-identified]     PCP: Damian Blanco MD    Date of Admission: 10/12/2017    Chief Complaint: change in mental status and coffee ground emesis      Patient was transferred from nursing home for further evaluation of change in mental status, tachypnea and hypoxia with oxygen saturation in the 70s. Apparently family physician was called by the nursing home last night saying that the patient was having coffee ground emesis and she was trying to manage him at the nursing home but patient continued to worsen as he became tachypneic and tachycardic and EMS was called and apparently patient was noticed to be hypotensive with blood pressure in 80s and a liter of bolus was given there.      Vitals here showed blood pressure of 117/32, respiratory rate of 42, heart rate of 128, temperature of 103 an oxygen saturation of 86% on 6 L. ABG showed pH of 7.39, PCO2 of 45, PO2 of 66 on 6 L. Labs showed elevated WBC of 28,800 with bandemia, lactic acidosis, potassium of 5.9. Urine analysis showed positive nitrite and leukocyte esterase. Chest x-ray suggested interstitial edema. Hospital Course:   Patient was started on broad-spectrum antibiotics, discussed with wife and she did not want intubation but wanted him to be treated medically, also got updated from PCP about his past medical problems. 10/14- patient coughing up little coffee ground material, possibly from previous aspiration, still tachypneic, BP on little low side, continue IVF and ABX, NG tube, will start NG tube feeding, has good BM's more liquidy  From kayexalate, urine out put not documented well as per RN, suspected oliguria but patient had good urine output as per nursing staff, has rhonchi in the lungs, continue current treatment    10/15- had a lot of diarrhea and was positive for C.  Diff, initially ABX changed to meropenem, and once C.  Dif was positive, ABX changed to Tegecycline and vancomycin  PO via NG tube, urine out put is low, nephrology consulted and IV fluids changed to bicarb drip for metabolic acidosis and renal failure, replace potassium, BP still on low side, hemoglobin stable    10/16- renal failure improved, acidosis resolved, sod bicarb drip changed to D5, electrolytes being replaced, patient little drowsy today unclear if he didn't sleep last night, still has watery diarrhea, or rhonchi present in the lungs, was having SVTs yesterday, started on low-dose beta blocker, Will try to sit him up in the chair when awake, physical therapy ordered, continue antibiotics, will need swallowing eval once little more alert, WBC still trending up in spite of antibiotics, will add flagyl,  If not improving and failing swallowing eval, may consider hospice in the next couple of days    Subjective: patient is little drowsy and sleeping, woke up but falling back asleep, has NG tube, rectocele with watery diarrhea    Medications:  Reviewed    Infusion Medications    dextrose 75 mL/hr at 10/16/17 0636    dextrose       Scheduled Medications    midodrine  5 mg Oral TID WC    potassium phosphate IVPB  12 mmol Intravenous Once    sodium chloride  1,000 mL Intravenous Once    famotidine  20 mg Oral Daily    tigecycline (TYGACIL) IVPB  50 mg Intravenous Q12H    vancomycin  250 mg Oral 4 times per day    potassium (CARDIAC) replacement protocol   Other RX Placeholder    metoprolol tartrate  12.5 mg Oral BID    calcium replacement protocol   Other RX Placeholder    phosphorus replacement protocol   Other RX Placeholder    magnesium replacement protocol   Other RX Placeholder    ferrous sulfate  325 mg Oral Daily with breakfast    finasteride  5 mg Oral Daily    folic acid  1 mg Oral Daily    ipratropium-albuterol  3 mL Inhalation Q4H WA    lactobacillus  1 capsule Oral BID    levothyroxine  100 mcg Oral 10/14/2017 8:31 AM      XR Abdomen G Tube Placement   Final Result   Esophageal route tube tip in the stomach. **This report has been created using voice recognition software. It may contain minor errors which are inherent in voice recognition technology. **      Final report electronically signed by Dr. Rosmery Lock on 10/13/2017 3:28 PM      XR Chest Single For Ng Tube Placement   Final Result   1. Position of the NG tube is uncertain at this time. Uncertain if this is actually below the diaphragm or above the diaphragm. 2. A routine mobile view of the chest with standard positioning would be helpful for further evaluation. **This report has been created using voice recognition software. It may contain minor errors which are inherent in voice recognition technology. **      Final report electronically signed by Dr. Ezekiel Porras on 10/13/2017 1:21 PM      XR ABDOMEN (KUB) (SINGLE AP VIEW)   Final Result   1. Findings consistent with mild ileus. 2.  Constipation and possible fecal impaction within the rectum are possible. *This report has been created using voice recognition software. It may contain minor errors which are inherent in voice recognition technology. **      Final report electronically signed by Dr. Rosmery Lock on 10/13/2017 10:10 AM      CT CHEST WO CONTRAST   Final Result   1. There are patchy infiltrates within the lower lobes bilaterally suggesting a pneumonia. There are also vague radiodensities within the upper chest bilaterally measuring 0.5 cm on the right and 0.6 cm on the left suggesting either focal consolidation    or a lung nodule (series 2 image 16). 2. There is circumferential thickening of the distal esophagus. An esophagram could be performed for additional evaluation. 3. Numerous additional findings as described in the body of the report. **This report has been created using voice recognition software.   It may contain minor errors which are inherent in voice recognition technology. **      Final report electronically signed by Dr. Cameron Saleem on 10/13/2017 10:04 AM      XR Chest Portable   Final Result   Interstitial edema. Possible developing peribronchial infiltrates at both bases            **This report has been created using voice recognition software. It may contain minor errors which are inherent in voice recognition technology. **      Final report electronically signed by Dr. Itzel Araujo on 10/12/2017 11:00 PM          Diet: DIET TUBE FEED CONTINUOUS/CYCLIC NPO; Renal Formula; Nasogastric; Continuous; 20; 60; 40    DVT prophylaxis: [] Lovenox                                 [x] SCDs                                 [] SQ Heparin                                 [] Encourage ambulation           [] Already on Anticoagulation     Disposition:    [] Home       [] TCU       [] Rehab       [] Psych       [x] SNF       [] [place]       [] Other-    Code Status: Limited    PT/OT Eval Status:  Once more stable    Assessment/Plan:    Anticipated Discharge in : 4-5 days    Active Hospital Problems    Diagnosis Date Noted    Mental confusion [R41.0] 07/02/2017     Priority: High     Class: Acute    Late onset Alzheimer's disease with behavioral disturbance  and mental confusion [G30.1, F02.81] 07/02/2017     Priority: High     Class: Acute    Obstructive sleep apnea treated with BiPAP [G47.33] 07/09/2015     Priority: High    Ileus (HCC) [K56.7]     C. difficile diarrhea [A04.72]     Hypokalemia [A18.2]     Metabolic acidosis [O82.0]     Acute respiratory failure with hypoxia (HCC) [J96.01]     Acute kidney injury ([address] [N17.9]     Hyperkalemia [E87.5]     Hypocalcemia [E83.51]     Urinary tract infection without hematuria [N39.0]     Aspiration pneumonia of both lower lobes due to gastric secretions ([address] [J69.0]     Coffee ground emesis [K92.0]     Benign prostatic hyperplasia [N40.0]     COPD without exacerbation ([address] [J44.9]     Unilateral AKA, right ([facility] [Z89.611]     History of arterial occlusion [Z86.718]     Sepsis ([facility] [A41.9] 10/13/2017    Acute respiratory distress [R06.03] 10/13/2017    Hematemesis [K92.0] 10/13/2017    Severe malnutrition ([facility] [E43] 10/13/2017     Class: Acute    Blood poisoning ([facility] [A41.9]     Pneumonia of both lower lobes due to infectious organism [J18.9]     Dyspnea and respiratory abnormalities [R06.00, R06.89]     Unable to ambulate [R26.2] 06/17/2016    Dysphagia [R13.10] 06/17/2016    GERD (gastroesophageal reflux disease) [K21.9] 10/09/2012       Sepsis ? UTI versus pneumonia- continue broad-spectrum antibiotics, IV hydration, infectious disease on board, WBC trending down, urine culture shows mixed growth, blood cultures negative so far, has rhonchi in the lungs  - ABX changed to tegecycline on 10/15, BP still on low side  - 10/16 blood pressure improved    C. Diff diarrhea- on oral vancomycin, add Flagyl    Metabolic acidosis  Started on on bicarb drip on 10/15- bicarb is 24 today and bicarb drip stopped on 10/16      Acute kidney injury- from dehydration and hypotension and sepsis, continue IV hydration, creatinine, around 1.9, nephrology consulted on 10/15, started on bicarb drip, oliguric, sig positive fluid balance  - 10/16, improved    Acute respiratory failure, hypoxia ?  Pneumonia versus pulmonary edema, cannot rule out PE:  Most likely from pneumonia, aspiration, continue oxygen supplementation    Hyperkalemia- improving with Late, trended down from 6.6-5.4, and is low now    Hypokalemia- repalced  Hypophosphatemia- replacement protocol      SVT, on low-dose beta blocker    Suspicion for ileus- had several bowel movements, sluggish bowel sounds, continue Dulcolax rectal suppository and also start NG tube feeding  - has diarrhea now    Lactic acidosis- still little high, continue IV hydration and antibiotics      Hypocalcemia, replacement protocol      Elevated troponin- Unclear clinical significance    Coffee-ground emesis? Gastris vs bleed, continue Protonix, monitor hemoglobin    HX of recent  C.  Diff dairrhea      Alzheimer's disease with behavioral disturbance- chronic,    History of stroke ×2, more than 10 years ago, foot drop on the left    COPD- bronchodilators  GERD- PPI  Sleep apnea  Hypothyroidism- TSH is little elevated    HX of atrial occulusion s/p right AKA    Benign prostate hypertrophy- history of suprapubic catheter- Currently Foleys placed           Electronically signed by Yao Tolentino MD on 10/16/2017 at 7:14 AM

## 2017-10-16 NOTE — PROGRESS NOTES
modifications mentioned below. My modifications:  He is not in any respiratory distress. Aspiration pre cautions. Limited code status.     Prateek Espino MD 10/16/2017 4:17 PM

## 2017-10-16 NOTE — PROGRESS NOTES
chloride  1,000 mL Intravenous Once    famotidine  20 mg Oral Daily    tigecycline (TYGACIL) IVPB  50 mg Intravenous Q12H    vancomycin  250 mg Oral 4 times per day    potassium (CARDIAC) replacement protocol   Other RX Placeholder    metoprolol tartrate  12.5 mg Oral BID    calcium replacement protocol   Other RX Placeholder    phosphorus replacement protocol   Other RX Placeholder    magnesium replacement protocol   Other RX Placeholder    ferrous sulfate  325 mg Oral Daily with breakfast    finasteride  5 mg Oral Daily    folic acid  1 mg Oral Daily    ipratropium-albuterol  3 mL Inhalation Q4H WA    lactobacillus  1 capsule Oral BID    levothyroxine  100 mcg Oral Daily    traZODone  50 mg Oral Nightly    sodium chloride flush  10 mL Intravenous 2 times per day    dextrose  25 g Intravenous Once    insulin regular  8 Units Intravenous Once    miconazole   Topical BID     Continuous Infusions:   dextrose      dextrose         CBC:   Recent Labs      10/14/17   0419  10/15/17   0330   WBC  21.6*  28.3*   HGB  10.6*  10.2*   PLT  242  229     CMP:  Recent Labs      10/15/17   0330  10/15/17   1919  10/16/17   0534   NA  143  144  148*   K  3.3*  3.5  3.5   CL  113*  115*  113*   CO2  18*  18*  24   BUN  44*  42*  50*   CREATININE  1.7*  1.3*  1.2   GLUCOSE  144*  135*  127*   CALCIUM  7.0*  6.9*  6.9*   LABGLOM  39*  53*  58*     Troponin: No results for input(s): TROPONINI in the last 72 hours. BNP: No results for input(s): BNP in the last 72 hours. INR:   Recent Labs      10/14/17   0419  10/15/17   0330  10/16/17   0534   INR  1.31*  1.16*  1.21*     Lipids: Recent Labs      10/14/17   0419   LIPASE  6.9     Liver: Recent Labs      10/14/17   0419   AST  22   ALT  14   ALKPHOS  98   PROT  5.3*   LABALBU  2.0*   BILITOT  0.5     Iron:  No results for input(s): IRONS, FERRITIN in the last 72 hours.     Invalid input(s): LABIRONS    Objective:   Vitals: BP 92/70   Pulse 94   Temp 100.2 °F (37.9 °C) (Core)   Resp 29   Ht 5' 4\" (1.626 m)   Wt 160 lb 0.9 oz (72.6 kg)   SpO2 98%   BMI 27.47 kg/m²    Wt Readings from Last 3 Encounters:   10/15/17 160 lb 0.9 oz (72.6 kg)   09/20/17 200 lb (90.7 kg)   08/20/17 205 lb (93 kg)      24HR INTAKE/OUTPUT:    Intake/Output Summary (Last 24 hours) at 10/16/17 0612  Last data filed at 10/16/17 0523   Gross per 24 hour   Intake           4181.5 ml   Output             1175 ml   Net           3006.5 ml       Constitutional: Sleeping   Skin:normal   HEENT:Pupils are reactive . Throat is clear with intact NG tube   Neck:supple with no thyromegally  Cardiovascular:  S1, S2 without m/r/g   Respiratory:  Decreased sound   Abdomen: +bs, soft, non tender   Ext: + LE edema  Musculoskeletal:Intact  Neuro:DEferred      Electronically signed by Chester Hall MD on 10/16/2017 at 6:12 AM

## 2017-10-16 NOTE — PROGRESS NOTES
Renal ([place])  · Rate (ml/hr):40ml/hr    · Volume (ml/day): 960 ml  · Duration: Continuous 24hrs  · TF Residuals: Not applicable  · Water Flushes:  (per Dr)  · Goal TF & Flush Orders Provides: 1728 kcals, 78 grams protein & 155 grams CHO/24 hours  · Anthropometric Measures:  · Ht: 5' 4\" (162.6 cm)   · Current Body Wt: 160 lb 0.9 oz (72.6 kg) (+1 pitting edema noted)  · Admission Body Wt: 152 lb 5.4 oz (69.1 kg) (10/13)  · Usual Body Wt:  (7/15/17 pt weighed 205#  & had AKA in  September  & continued to lose wt.  pt weighed ~180-190# afer AKA with speaking to daughter)  · % Weight Change: 22% in 3 months not counting AKA,     · Ideal Body Wt: 120 lb (54.4 kg) (adjusted for AKA), % Ideal Body    ·  BMI Classification: BMI 30.0 - 34.9 Obese Class I  · Comparative Standards (Estimated Nutrition Needs):  · Estimated Daily Total Kcal: ~1727 kcals ( 25 kcals/kg on admit wt of 69.1 kg)  · Estimated Daily Protein (g): ~70 grams ( 1 gram protein/kg on admit wt of 69.1 kg)  Estimated Intake vs Estimated Needs: Intake Less Than Needs    Nutrition Risk Level: High    Nutrition Interventions:   Continue current Tube Feeding, Start oral diet  Continued Inpatient Monitoring, Education not appropriate at this time    Nutrition Evaluation:   · Evaluation: Progressing toward goals   · Goals: TF to provide 80% of nutrient needs until pt able to transitition to adequate oral intake   · Monitoring: Meal Intake, TF Intake, Diet Tolerance, TF Tolerance, Chewing/Swallowing, Pertinent Labs, Comparative Standards, Weight, Mental Status/Confusion, Ascites/Edema, Skin Integrity    See Adult Nutrition Doc Flowsheet for more detail.      Electronically signed by Jocelyn Araujo RD, LD on 10/16/17 at 10:05 AM    Contact Number: [phone]

## 2017-10-16 NOTE — PLAN OF CARE
Problem: DISCHARGE BARRIERS  Goal: Patient's continuum of care needs are met  Outcome: Ongoing  Patient return to ECF. See SW notes.

## 2017-10-16 NOTE — PROGRESS NOTES
INFECTIOUS DISEASES  PROGRESS NOTE    Pt Name: Jose Manriquez  MRN: [id number]  [id number]  YOB: 1940  Admit Date: 10/12/2017 10:02 PM  Date of evaluation: 10/16/2017  Primary Care Physician: Russell Hamilton MD   3A-01/001-A   78-year-old male with a history of stroke and also problems with dementia, Alzheimer's  disease, GERD, BPH, and history of suprapubic catheter in the past and  also significant problems with the patient undergoing right lower  extremity AKA amputation done. The patient had this amputation done 8  weeks ago and prior to this admission on 10/13/2017. The patient had  been noted to be hypoxemic and had been placed on BiPAP. Noted to be  also hypotensive and hypoxic. Had c.difficle positive on 9/20/17    Subjective: Interval History: As above. Notes reviewed.   And repeat c.difficle 10/15/17 so changed to tigacil and vanco    Active ATBs: zosyn vanco 10/13 - 10/15  tigacil and vanco 10/15    Pt/Chart review:  Fever No, DiarrheaNo, Dyspnea No, Nausea:None      Data:   Scheduled Meds:   midodrine  5 mg Oral TID WC    potassium phosphate IVPB  12 mmol Intravenous Once    potassium chloride  40 mEq Oral Once    sodium chloride  1,000 mL Intravenous Once    famotidine  20 mg Oral Daily    tigecycline (TYGACIL) IVPB  50 mg Intravenous Q12H    vancomycin  250 mg Oral 4 times per day    potassium (CARDIAC) replacement protocol   Other RX Placeholder    metoprolol tartrate  12.5 mg Oral BID    calcium replacement protocol   Other RX Placeholder    phosphorus replacement protocol   Other RX Placeholder    magnesium replacement protocol   Other RX Placeholder    ferrous sulfate  325 mg Oral Daily with breakfast    finasteride  5 mg Oral Daily    folic acid  1 mg Oral Daily    ipratropium-albuterol  3 mL Inhalation Q4H WA    lactobacillus  1 capsule Oral BID    levothyroxine  100 mcg Oral Daily    traZODone  50 mg Oral Nightly    sodium chloride flush  10 mL Intravenous 2 times per day    dextrose  25 g Intravenous Once    insulin regular  8 Units Intravenous Once    miconazole   Topical BID     Continuous Infusions:   dextrose 75 mL/hr at 10/16/17 0636    dextrose         I & O's:  I/O last 3 completed shifts: In: 4181.5 [I.V.:2767.5; NG/GT:1414]  Out: 4770 [Urine:700; Stool:475]  No intake/output data recorded. Intake/Output Summary (Last 24 hours) at 10/16/17 0806  Last data filed at 10/16/17 0523   Gross per 24 hour   Intake           4181.5 ml   Output             1175 ml   Net           3006.5 ml       Date 10/16/17 0000 - 10/16/17 2359   Shift 5341-0962 5463-6997 9077-9889 24 Hour Total   I  N  T  A  K  E   I.V.  (mL/kg) 1061.3  (14.6)   1061.3  (14.6)    NG/GT  (mL/kg) 477  (6.6)   477  (6.6)    Shift Total  (mL/kg) 1538.3  (21.2)   1538.3  (21.2)   O  U  T  P  U  T   Urine  (mL/kg/hr) 275  (0.5)   275    Stool  (mL/kg) 300  (4.1)   300  (4.1)    Shift Total  (mL/kg) 575  (7.9)   575  (7.9)   Weight (kg) 72.6 72.6 72.6 72.6           CBC:   Recent Labs      10/14/17   0419  10/15/17   0330  10/16/17   0534   WBC  21.6*  28.3*  34.6*   HGB  10.6*  10.2*  10.8*   PLT  242  229  240     BMP:    Recent Labs      10/15/17   0330  10/15/17   1919  10/16/17   0534   NA  143  144  148*   K  3.3*  3.5  3.5   CL  113*  115*  113*   CO2  18*  18*  24   BUN  44*  42*  50*   CREATININE  1.7*  1.3*  1.2   GLUCOSE  144*  135*  127*     Hepatic:   Recent Labs      10/14/17   0419   AST  22   ALT  14   BILITOT  0.5   ALKPHOS  98     BNP: No results for input(s): BNP in the last 72 hours. URINALYSIS:  Results for Sneha Salgado (MRN [id number]) as of 10/14/2017 12:45   Ref.  Range 10/13/2017 05:00   Color, UA Latest Ref Range: YELLOW-STR  DARK BROWN   Bilirubin, Urine Latest Ref Range: NEGATIVE  MODERATE (A)   Ketones, Urine Latest Ref Range: NEGATIVE  TRACE (A)   Specific [place], UA Latest Ref Range: 1.002 - 1.03  >1.030 (A)   Blood, Urine Latest Ref Range: NEGATIVE  LARGE (A)   pH, UA Latest Ref Range: 5.0 - 9.0  5.0   Protein, UA Latest Ref Range: NEGATIVE mg/dl 100 (A)   Urobilinogen, Urine Latest Ref Range: 0.0 - 1.0 eu/dl 0.2   Nitrite, Urine Latest Ref Range: NEGATIVE  POSITIVE (A)   Leukocyte Esterase, Urine Latest Ref Range: NEGATIVE  MODERATE (A)   Casts Latest Ref Range: NONE SEEN /lpf 0-4 HYALINE   WBC, UA Latest Ref Range: 0-4/hpf /hpf    RBC, UA Latest Ref Range: 0-2/hpf /hpf 15-20   Epi Cells Latest Ref Range: 3-5/hpf /hpf 0-2   Renal Epithelial, Urine Latest Ref Range: NONE SEEN  NONE SEEN   Bacteria, UA Latest Ref Range: FEW/NONE S  MODERATE   Yeast, Urine Latest Ref Range: NONE SEEN  NONE SEEN   Crystals Latest Ref Range: NONE SEEN  NONE SEEN   Ictotest Latest Ref Range: NEGATIVE  NEGATIVE   Character, Urine Latest Ref Range: CLR-SL.BRODIE  TURBID (A)   Chloride, Urine Latest Units: meq/l 20.0   Creatinine, Urine Latest Units: mg/dl 241.6   Osmolality, Ur Latest Ref Range: 250 - 750 mosmol/kg 388   Potassium, Urine Latest Units: meq/l 86.3   Sodium, Ur Latest Units: meq/l 24     MICRO:   Cultures no growth    IMAGING:   10/14 cxr  Impression   Slightly worsening bibasilar atelectasis/infiltrate.             Objective:   Vitals: /77   Pulse 95   Temp 99.9 °F (37.7 °C) (Core)   Resp 24   Ht 5' 4\" (1.626 m)   Wt 160 lb 0.9 oz (72.6 kg)   SpO2 98%   BMI 27.47 kg/m²   HEENT: Head: Normocephalic, no lesions, without obvious abnormality. Lungs: diminished breath sounds bibasilar-- rhonci   Heart: S1, S2 normal  Abdomen: soft, non-tender; bowel sounds normal; no masses,  no organomegaly  Extremities: extremities normal, atraumatic, no cyanosis or edema  Neurologic: Mental status: lethagic    Wound/Ulcers: Skin Ulcer right AKA site stable        Assessment:     1. Severe sepsis with shock. 2.  Aspiration pneumonia. 3.  Acute respiratory failure. 4.  UTI. 5.  History of prostate problems with BPH.   6.  Peripheral vascular disease, status

## 2017-10-16 NOTE — PROGRESS NOTES
[facility] CCU 3A  Bedside Swallowing Evaluation      SLP Individual Minutes  Time In: 915  Time Out: 30  Minutes: 15          Date: 10/16/2017  Patient Name: Carleene Apley      CSN: [id number]   : 1940  (76 y.o.)  Gender: male   Referring Physician:  Dr Guillermo Spencer  Diagnosis: AMS and emesis  Secondary Diagnosis:  Dysphagia  History of Present Illness/Injury: Pt admit with the above diagnosis. Concern for aspiration due to poor mentation. NG was placed and pt made NPO over the weekend. Speech ordered to assess current swallowing function to determine safety for resuming oral diet.     Past Medical History:   Diagnosis Date    Alzheimer disease     Cerebral artery occlusion with [facility] infarction [facility]     COPD (chronic obstructive pulmonary disease) (HCC)     CVA (cerebral infarction)     Dementia     Foot drop, left     GERD (gastroesophageal reflux disease)     Sleep apnea     Thyroid disease     Ulcer ([facility]        Pain:  Unable to state    Current Diet: NPOwith NG, previous diet recommendations from 2017 for Dysphagia II and thin diet    Respiratory Status: [x] Independent     Behavioral Observation: [] Alert [] Oriented [x] Confused [x] Lethargic      [] Dysarthric [x] Limited Direction Following [] Agitated      [] Other:    ORAL MECHANISM EVALUATION:         Comments:  Facial / Labial []WFL [x] Impaired []DNT Moderate weakness and decreased coordination   Lingual []WFL [x] Impaired []DNT Moderate weakness and decreased coordination   Dentition []WFL [x] Impaired []DNT edentulous   Velum []WFL [] Impaired [x]DNT    Vocal Quality [x]WFL [] Impaired []DNT    Sensation []WFL [] Impaired [x]DNT    Cough []WFL [] Impaired []DNT    Other: []WFL [] Impaired []DNT    Other: []WFL [] Impaired []DNT        PATIENT WAS EVALUATED USING:  Puree, ice chips, and nectar liquids by cup    ORAL PHASE: [] WFL  [x] Impaired   [] Loss of bolus from lips [x]

## 2017-10-17 ENCOUNTER — APPOINTMENT (OUTPATIENT)
Dept: GENERAL RADIOLOGY | Age: 77
DRG: 871 | End: 2017-10-17
Payer: MEDICARE

## 2017-10-17 LAB
ANION GAP SERPL CALCULATED.3IONS-SCNC: 9 MEQ/L (ref 8–16)
ANISOCYTOSIS: ABNORMAL
BANDED NEUTROPHILS ABSOLUTE COUNT: 5.6 THOU/MM3
BANDS PRESENT: 15 %
BASOPHILS # BLD: 0 %
BASOPHILS ABSOLUTE: 0 THOU/MM3 (ref 0–0.1)
BUN BLDV-MCNC: 52 MG/DL (ref 7–22)
CALCIUM IONIZED: 0.95 MMOL/L (ref 1.12–1.32)
CALCIUM SERPL-MCNC: 7.3 MG/DL (ref 8.5–10.5)
CHLORIDE BLD-SCNC: 112 MEQ/L (ref 98–111)
CO2: 22 MEQ/L (ref 23–33)
CREAT SERPL-MCNC: 1 MG/DL (ref 0.4–1.2)
DIFFERENTIAL, MANUAL: NORMAL
EOSINOPHIL # BLD: 0 %
EOSINOPHILS ABSOLUTE: 0 THOU/MM3 (ref 0–0.4)
GFR SERPL CREATININE-BSD FRML MDRD: 72 ML/MIN/1.73M2
GLUCOSE BLD-MCNC: 112 MG/DL (ref 70–108)
GLUCOSE BLD-MCNC: 94 MG/DL (ref 70–108)
HCT VFR BLD CALC: 39.5 % (ref 42–52)
HEMOGLOBIN: 12.4 GM/DL (ref 14–18)
INR BLD: 1.11 (ref 0.85–1.13)
LYMPHOCYTES # BLD: 8 %
LYMPHOCYTES ABSOLUTE: 3 THOU/MM3 (ref 1–4.8)
MAGNESIUM: 2.3 MG/DL (ref 1.6–2.4)
MCH RBC QN AUTO: 28.7 PG (ref 27–31)
MCHC RBC AUTO-ENTMCNC: 31.4 GM/DL (ref 33–37)
MCV RBC AUTO: 91.5 FL (ref 80–94)
METAMYELOCYTES: 2 %
MONOCYTES # BLD: 1 %
MONOCYTES ABSOLUTE: 0.4 THOU/MM3 (ref 0.4–1.3)
MYELOCYTES: 2 %
NUCLEATED RED BLOOD CELLS: 0 /100 WBC
ORGANISM: ABNORMAL
PDW BLD-RTO: 18.8 % (ref 11.5–14.5)
PHOSPHORUS: 2.6 MG/DL (ref 2.4–4.7)
PLATELET # BLD: 251 THOU/MM3 (ref 130–400)
PLATELET ESTIMATE: ADEQUATE
PMV BLD AUTO: 10.3 MCM (ref 7.4–10.4)
POIKILOCYTES: ABNORMAL
POTASSIUM SERPL-SCNC: 4.6 MEQ/L (ref 3.5–5.2)
PROMYELOCYTES: 1 %
RBC # BLD: 4.31 MILL/MM3 (ref 4.7–6.1)
RBC # BLD: ABNORMAL 10*6/UL
SEG NEUTROPHILS: 71 %
SEGMENTED NEUTROPHILS ABSOLUTE COUNT: 26.6 THOU/MM3 (ref 1.8–7.7)
SODIUM BLD-SCNC: 143 MEQ/L (ref 135–145)
TOXIC GRANULATION: SLIGHT
VRE CULTURE: ABNORMAL
WBC # BLD: 37.5 THOU/MM3 (ref 4.8–10.8)

## 2017-10-17 PROCEDURE — G8979 MOBILITY GOAL STATUS: HCPCS

## 2017-10-17 PROCEDURE — 87070 CULTURE OTHR SPECIMN AEROBIC: CPT

## 2017-10-17 PROCEDURE — 83735 ASSAY OF MAGNESIUM: CPT

## 2017-10-17 PROCEDURE — 2580000003 HC RX 258: Performed by: INTERNAL MEDICINE

## 2017-10-17 PROCEDURE — 87205 SMEAR GRAM STAIN: CPT

## 2017-10-17 PROCEDURE — 80048 BASIC METABOLIC PNL TOTAL CA: CPT

## 2017-10-17 PROCEDURE — 85610 PROTHROMBIN TIME: CPT

## 2017-10-17 PROCEDURE — 6370000000 HC RX 637 (ALT 250 FOR IP): Performed by: INTERNAL MEDICINE

## 2017-10-17 PROCEDURE — 6370000000 HC RX 637 (ALT 250 FOR IP): Performed by: ANESTHESIOLOGY

## 2017-10-17 PROCEDURE — 99232 SBSQ HOSP IP/OBS MODERATE 35: CPT | Performed by: INTERNAL MEDICINE

## 2017-10-17 PROCEDURE — G8978 MOBILITY CURRENT STATUS: HCPCS

## 2017-10-17 PROCEDURE — 71010 XR CHEST PORTABLE: CPT

## 2017-10-17 PROCEDURE — 82948 REAGENT STRIP/BLOOD GLUCOSE: CPT

## 2017-10-17 PROCEDURE — 2500000003 HC RX 250 WO HCPCS: Performed by: INTERNAL MEDICINE

## 2017-10-17 PROCEDURE — 94640 AIRWAY INHALATION TREATMENT: CPT

## 2017-10-17 PROCEDURE — 87075 CULTR BACTERIA EXCEPT BLOOD: CPT

## 2017-10-17 PROCEDURE — 2580000003 HC RX 258: Performed by: ANESTHESIOLOGY

## 2017-10-17 PROCEDURE — 84100 ASSAY OF PHOSPHORUS: CPT

## 2017-10-17 PROCEDURE — 36415 COLL VENOUS BLD VENIPUNCTURE: CPT

## 2017-10-17 PROCEDURE — 92526 ORAL FUNCTION THERAPY: CPT

## 2017-10-17 PROCEDURE — 1200000000 HC SEMI PRIVATE

## 2017-10-17 PROCEDURE — 6360000002 HC RX W HCPCS: Performed by: INTERNAL MEDICINE

## 2017-10-17 PROCEDURE — 85025 COMPLETE CBC W/AUTO DIFF WBC: CPT

## 2017-10-17 PROCEDURE — 87077 CULTURE AEROBIC IDENTIFY: CPT

## 2017-10-17 PROCEDURE — 6360000002 HC RX W HCPCS: Performed by: FAMILY MEDICINE

## 2017-10-17 PROCEDURE — 94669 MECHANICAL CHEST WALL OSCILL: CPT

## 2017-10-17 PROCEDURE — 74000 XR ABDOMEN LIMITED (KUB): CPT

## 2017-10-17 PROCEDURE — 82330 ASSAY OF CALCIUM: CPT

## 2017-10-17 PROCEDURE — 87186 SC STD MICRODIL/AGAR DIL: CPT

## 2017-10-17 PROCEDURE — 87106 FUNGI IDENTIFICATION YEAST: CPT

## 2017-10-17 PROCEDURE — 97162 PT EVAL MOD COMPLEX 30 MIN: CPT

## 2017-10-17 RX ORDER — LORAZEPAM 2 MG/ML
1 INJECTION INTRAMUSCULAR EVERY 4 HOURS PRN
Status: DISCONTINUED | OUTPATIENT
Start: 2017-10-17 | End: 2017-10-18 | Stop reason: HOSPADM

## 2017-10-17 RX ORDER — MORPHINE SULFATE 2 MG/ML
2 INJECTION, SOLUTION INTRAMUSCULAR; INTRAVENOUS
Status: DISCONTINUED | OUTPATIENT
Start: 2017-10-17 | End: 2017-10-18 | Stop reason: HOSPADM

## 2017-10-17 RX ORDER — MIDODRINE HYDROCHLORIDE 10 MG/1
10 TABLET ORAL
Status: DISCONTINUED | OUTPATIENT
Start: 2017-10-17 | End: 2017-10-17

## 2017-10-17 RX ORDER — GLYCOPYRROLATE 0.2 MG/ML
0.2 INJECTION INTRAMUSCULAR; INTRAVENOUS
Status: DISCONTINUED | OUTPATIENT
Start: 2017-10-17 | End: 2017-10-18 | Stop reason: HOSPADM

## 2017-10-17 RX ORDER — CALCIUM CARBONATE 500(1250)
2000 TABLET ORAL
Status: COMPLETED | OUTPATIENT
Start: 2017-10-17 | End: 2017-10-17

## 2017-10-17 RX ORDER — DEXTROSE AND SODIUM CHLORIDE 5; .45 G/100ML; G/100ML
INJECTION, SOLUTION INTRAVENOUS CONTINUOUS
Status: DISCONTINUED | OUTPATIENT
Start: 2017-10-17 | End: 2017-10-18 | Stop reason: HOSPADM

## 2017-10-17 RX ADMIN — IPRATROPIUM BROMIDE AND ALBUTEROL SULFATE 3 ML: .5; 3 SOLUTION RESPIRATORY (INHALATION) at 12:36

## 2017-10-17 RX ADMIN — DEXTROSE AND SODIUM CHLORIDE: 5; 450 INJECTION, SOLUTION INTRAVENOUS at 07:53

## 2017-10-17 RX ADMIN — Medication 250 MG: at 05:36

## 2017-10-17 RX ADMIN — SODIUM CHLORIDE 50 MG: 9 INJECTION, SOLUTION INTRAVENOUS at 11:52

## 2017-10-17 RX ADMIN — OXYCODONE HYDROCHLORIDE 5 MG: 5 TABLET ORAL at 17:25

## 2017-10-17 RX ADMIN — CALCIUM 2000 MG: 500 TABLET ORAL at 11:52

## 2017-10-17 RX ADMIN — METOPROLOL TARTRATE 12.5 MG: 25 TABLET ORAL at 09:02

## 2017-10-17 RX ADMIN — Medication 325 MG: at 08:00

## 2017-10-17 RX ADMIN — CALCIUM 2000 MG: 500 TABLET ORAL at 10:22

## 2017-10-17 RX ADMIN — IPRATROPIUM BROMIDE AND ALBUTEROL SULFATE 3 ML: .5; 3 SOLUTION RESPIRATORY (INHALATION) at 07:45

## 2017-10-17 RX ADMIN — SODIUM CHLORIDE 50 MG: 9 INJECTION, SOLUTION INTRAVENOUS at 00:46

## 2017-10-17 RX ADMIN — FINASTERIDE 5 MG: 5 TABLET, FILM COATED ORAL at 08:00

## 2017-10-17 RX ADMIN — LEVOTHYROXINE SODIUM 100 MCG: 100 TABLET ORAL at 08:00

## 2017-10-17 RX ADMIN — MORPHINE SULFATE 2 MG: 2 INJECTION, SOLUTION INTRAMUSCULAR; INTRAVENOUS at 23:36

## 2017-10-17 RX ADMIN — Medication 1 CAPSULE: at 08:58

## 2017-10-17 RX ADMIN — Medication 250 MG: at 17:48

## 2017-10-17 RX ADMIN — METRONIDAZOLE 500 MG: 500 INJECTION, SOLUTION INTRAVENOUS at 04:50

## 2017-10-17 RX ADMIN — MICONAZOLE NITRATE: 2 POWDER TOPICAL at 08:05

## 2017-10-17 RX ADMIN — MIDODRINE HYDROCHLORIDE 10 MG: 10 TABLET ORAL at 11:53

## 2017-10-17 RX ADMIN — Medication 250 MG: at 00:47

## 2017-10-17 RX ADMIN — MIDODRINE HYDROCHLORIDE 10 MG: 10 TABLET ORAL at 08:00

## 2017-10-17 RX ADMIN — METRONIDAZOLE 500 MG: 500 INJECTION, SOLUTION INTRAVENOUS at 13:51

## 2017-10-17 RX ADMIN — CALCIUM 2000 MG: 500 TABLET ORAL at 08:57

## 2017-10-17 RX ADMIN — FOLIC ACID 1 MG: 1 TABLET ORAL at 08:00

## 2017-10-17 RX ADMIN — MORPHINE SULFATE 1 MG: 2 INJECTION, SOLUTION INTRAMUSCULAR; INTRAVENOUS at 20:13

## 2017-10-17 RX ADMIN — MIDODRINE HYDROCHLORIDE 10 MG: 10 TABLET ORAL at 17:27

## 2017-10-17 RX ADMIN — Medication 250 MG: at 11:52

## 2017-10-17 RX ADMIN — IPRATROPIUM BROMIDE AND ALBUTEROL SULFATE 3 ML: .5; 3 SOLUTION RESPIRATORY (INHALATION) at 16:06

## 2017-10-17 RX ADMIN — FAMOTIDINE 20 MG: 20 TABLET, FILM COATED ORAL at 08:00

## 2017-10-17 RX ADMIN — Medication 10 ML: at 08:00

## 2017-10-17 ASSESSMENT — PAIN SCALES - PAIN ASSESSMENT IN ADVANCED DEMENTIA (PAINAD)
TOTALSCORE: 0
BREATHING: 0
BREATHING: 0
BODYLANGUAGE: 0
TOTALSCORE: 0
CONSOLABILITY: 0
TOTALSCORE: 0
BREATHING: 1
NEGVOCALIZATION: 1
BODYLANGUAGE: 0
TOTALSCORE: 2
NEGVOCALIZATION: 1
CONSOLABILITY: 1
NEGVOCALIZATION: 0
BODYLANGUAGE: 0
CONSOLABILITY: 0
CONSOLABILITY: 0
BREATHING: 1
CONSOLABILITY: 1
TOTALSCORE: 0
FACIALEXPRESSION: 0
BREATHING: 0
NEGVOCALIZATION: 1
BREATHING: 1
CONSOLABILITY: 0
FACIALEXPRESSION: 0
BODYLANGUAGE: 0
NEGVOCALIZATION: 0
BREATHING: 1
NEGVOCALIZATION: 1
BODYLANGUAGE: 1
FACIALEXPRESSION: 1
FACIALEXPRESSION: 0
NEGVOCALIZATION: 0
FACIALEXPRESSION: 0
NEGVOCALIZATION: 0
FACIALEXPRESSION: 0
FACIALEXPRESSION: 2
FACIALEXPRESSION: 0
CONSOLABILITY: 0
TOTALSCORE: 6
BODYLANGUAGE: 1
BODYLANGUAGE: 0
TOTALSCORE: 5
BODYLANGUAGE: 0
CONSOLABILITY: 0
BREATHING: 0
TOTALSCORE: 2

## 2017-10-17 ASSESSMENT — PAIN SCALES - GENERAL
PAINLEVEL_OUTOF10: 0
PAINLEVEL_OUTOF10: 0
PAINLEVEL_OUTOF10: 7
PAINLEVEL_OUTOF10: 5

## 2017-10-17 ASSESSMENT — PAIN DESCRIPTION - PAIN TYPE: TYPE: ACUTE PAIN

## 2017-10-17 ASSESSMENT — PAIN DESCRIPTION - DESCRIPTORS: DESCRIPTORS: PATIENT UNABLE TO DESCRIBE

## 2017-10-17 ASSESSMENT — PAIN DESCRIPTION - LOCATION: LOCATION: ABDOMEN

## 2017-10-17 NOTE — PROGRESS NOTES
Daily with breakfast    finasteride  5 mg Oral Daily    folic acid  1 mg Oral Daily    ipratropium-albuterol  3 mL Inhalation Q4H WA    lactobacillus  1 capsule Oral BID    levothyroxine  100 mcg Oral Daily    traZODone  50 mg Oral Nightly    sodium chloride flush  10 mL Intravenous 2 times per day    dextrose  25 g Intravenous Once    insulin regular  8 Units Intravenous Once    miconazole   Topical BID     PRN  potassium chloride **OR** potassium chloride **OR** potassium chloride, potassium chloride, acetaminophen, bisacodyl, LORazepam, nitroGLYCERIN, oxyCODONE, sodium chloride flush, magnesium hydroxide, ondansetron, glucose, dextrose, glucagon (rDNA), dextrose, acetaminophen  IV   dextrose 5 % and 0.45 % NaCl 75 mL/hr at 10/17/17 0753    dextrose         Diet/Nutrition   Diet Tube Feed Continuous/Cyclic w/ Diet  DIET DYSPHAGIA I PUREED; Honey Thick      ABG   Lab Results   Component Value Date    PH 7.50 10/14/2017    PCO2 24 10/14/2017    PO2 62 10/14/2017    HCO3 19 10/14/2017    O2SAT 94 10/14/2017     Lab Results   Component Value Date    IFIO2 25 10/14/2017    MODE CPAP/PS 10/14/2017    SETPEEP 7.0 10/14/2017     Vitals    height is 5' 4\" (1.626 m) and weight is 167 lb 1.7 oz (75.8 kg). His core temperature is 99.9 °F (37.7 °C). His blood pressure is 96/80 and his pulse is 85. His respiration is 28 and oxygen saturation is 93%.      O2 Flow Rate (L/min): 2 L/min  Patient Vitals for the past 8 hrs:   BP Temp Temp src Pulse Resp SpO2   10/17/17 0802 96/80 99.9 °F (37.7 °C) CORE 85 28 93 %   10/17/17 0602 101/62 99.9 °F (37.7 °C) CORE 86 28 100 %   10/17/17 0502 (!) 109/90 99.7 °F (37.6 °C) CORE 97 21 97 %   10/17/17 0402 (!) 93/56 99.3 °F (37.4 °C) CORE 84 28 97 %   10/17/17 0325 118/72 - - 84 25 96 %   10/17/17 0302 (!) 89/57 99.3 °F (37.4 °C) CORE 78 23 95 %   10/17/17 0202 92/60 99.3 °F (37.4 °C) CORE 81 23 96 %   10/17/17 0102 (!) 92/55 99.1 °F (37.3 °C) CORE 82 18 97 %     Range with no murmur. No peripheral edema  Pulmonary/Chest: respirations easy and unlabored, bibasilar rales L>R weak congested cough  Abdominal: Soft. Bowel sounds audible. No distension or tenderness to palp. Neurological: Patient is lethargic, will occasionally open eyes, will squeeze bilateral hands on command  Skin: bruising noted to right hand. Skin warm and dry   Labs   ABG  Lab Results   Component Value Date    PH 7.50 10/14/2017    PO2 62 10/14/2017    PCO2 24 10/14/2017    HCO3 19 10/14/2017    O2SAT 94 10/14/2017     Lab Results   Component Value Date    IFIO2 25 10/14/2017    MODE CPAP/PS 10/14/2017    SETPEEP 7.0 10/14/2017     CBC  Recent Labs      10/15/17   0330  10/16/17   0534   WBC  28.3*  34.6*   RBC  3.60*  3.79*   HGB  10.2*  10.8*   HCT  32.2*  34.0*   MCV  89.4  89.6   MCH  28.4  28.6   MCHC  31.8*  31.9*   RDW  18.3*  17.9*   PLT  229  240   MPV  9.7  9.9      BMP  Recent Labs      10/15/17   0330  10/15/17   1919  10/16/17   0534  10/17/17   0523   NA  143  144  148*  143   K  3.3*  3.5  3.5  4.6   CL  113*  115*  113*  112*   CO2  18*  18*  24  22*   BUN  44*  42*  50*  52*   CREATININE  1.7*  1.3*  1.2  1.0   GLUCOSE  144*  135*  127*  112*   MG  2.0   --   2.1  2.3   PHOS  3.0   --   1.6*  2.6   CALCIUM  7.0*  6.9*  6.9*  7.3*     LFT  No results for input(s): AST, ALT, ALB, BILITOT, ALKPHOS, LIPASE in the last 72 hours. Invalid input(s): AMYLASE  TROP  Lab Results   Component Value Date    TROPONINT 0.019 10/13/2017    TROPONINT 0.021 10/13/2017    TROPONINT 0.030 10/13/2017       Lactic Acid  Recent Labs      10/15/17   0330  10/16/17   0534   LACTA  2.4*  1.8     INR  Recent Labs      10/15/17   0330  10/16/17   0534  10/17/17   0523   INR  1.16*  1.21*  1.11     PTT  No results for input(s): APTT in the last 72 hours.     EKG   10/12/17  Sinus tachycardia with Fusion complexes  Right superior axis deviation  Pulmonary disease pattern  Cannot rule out Inferior infarct , age undetermined  Abnormal ECG  When compared with ECG of 23-AUG-2017 19:19,  Significant changes have occurred    Echo   07/03/17  Summary   Technically difficult examination. Normal left ventricle size and systolic function. Ejection fraction was   estimated at 55 %. There were no regional left ventricular wall motion   abnormalities and wall thickness was within normal limits. Doppler parameters were consistent with abnormal left ventricular   relaxation (grade 1 diastolic dysfunction). Cultures   C-diff +  Rapid Flu A/B-negative  Blood Culture- No prelim growth  MRSA-negative  VRE-positive  UA-Negative  Resp culture- not obtained  C-Diff (+)   Strep pneumo ag,UR- neg    Procalcitonin   Lab Results   Component Value Date    PROCAL 13.54 10/15/2017    PROCAL 21.98 10/14/2017    PROCAL 14.42 10/13/2017     Radiology:  CXR 10/17/17- radiology report pending     KUB  10/13/2017   1. Findings consistent with mild ileus. 2. Constipation and possible fecal impaction within the rectum are possible. CT Scans    CT Chest  10/13/2017   1. There are patchy infiltrates within the lower lobes bilaterally suggesting a pneumonia. There are also vague radiodensities within the upper chest bilaterally measuring 0.5 cm on the right and 0.6 cm on the left suggesting either focal consolidation or a lung nodule (series 2 image 16). 2. There is circumferential thickening of the distal esophagus. An esophagram could be performed for additional evaluation. 3. Numerous additional findings as described in the body of the report.         (See actual reports for details)    SYSTEMS ASSESSMENT AND PLANS   Acute respiratory failure with possible aspiration pneumonia: improved- Currently on RA with cpap at HS: Continue DuoNeb, pulmonary hygiene, CXR/ABG PRN  Altered mental status with baseline dementia/Alzhemiers  Speech therapy consult: ST following , aspiration precautions  Malnutrition: Continue NG with enteral nutrition  CINTHIA on

## 2017-10-17 NOTE — PROGRESS NOTES
[address] [address]  INPATIENT PHYSICAL THERAPY  EVALUATION  [facility] CCU 3A    Time In: 0900  Time Out: 0915  Timed Code Treatment Minutes: 0 Minutes  Minutes: 15          Date: 10/17/2017  Patient Name: Freddie Rosas,  Gender:  male        MRN: [id number]  : 1940  (76 y.o.)      Referring Practitioner: Dr. Jessica Fatima  Diagnosis: sepsis  Additional Pertinent Hx: admit with above diagnosis, acute resp failure with possible aspiration pneumonia, hx CVA, right AKA ~8 weeks ago, dementia, COPD     Past Medical History:   Diagnosis Date    Alzheimer disease     [facility] occlusion [facility]     COPD (chronic obstructive pulmonary disease) [facility] [address] [facility]     CVA (cerebral infarction)     Dementia     Foot drop, left     GERD (gastroesophageal reflux disease)     Sleep apnea     Thyroid disease     Ulcer ([facility] [address]      Past Surgical History:   Procedure Laterality Date    APPENDECTOMY      BACK SURGERY      COLONOSCOPY      ENDOSCOPY, COLON, DIAGNOSTIC         Restrictions/Precautions:  Restrictions/Precautions: General Precautions, Fall Risk    Subjective:  Chart Reviewed: Yes  Patient assessed for rehabilitation services?: Yes  Family / Caregiver Present: No  Subjective: pt only stating \"I have to pee\", difficulty following once step commands, discussed with [facility] pt difficulty with agitation and lethargy    General:         Pain:  Unable to Assess.   Pain Assessment  Pain Level: 0       Social/Functional History:    Type of Home: Facility  Home Layout: One level  Home Equipment: [address]   Transfer Assistance: Needs assistance (jacqueline at [facility], )  Additional Comments: pt was receiving therapy working on sitting balance and therex    Objective:    RLE AROM: Exceptions  RLE General AROM: AAROM WFL    LLE AROM : Exceptions  LLE General AROM: AAROM WFL           Strength RLE: Exception  Comment: pt not following one step commands, >/=2-/5    Strength LLE: Exception  Comment: Nurse notified (in bed with nursing after session)    Plan:  Times per week: 3-5X GM  Times per day: Daily  Specific instructions for Next Treatment: therex, bed mobility, sitting balance, PT to assess transfers  Current Treatment Recommendations: Strengthening, ROM, Balance Training, Endurance Training, Functional Mobility Training, Cognitive Reorientation, Safety Education & Training, Patient/Caregiver Education & Training, Home Exercise Program, Equipment Evaluation, Education, & procurement    Goals:  Patient goals : not stated  Short term goals  Time Frame for Short term goals: 2 weeks  Short term goal 1: bed mobility with maxAx2 pt initiating movement, to get in/out of bed  Short term goal 2: tolerate >10 min sitting balance activity with </=minAx1 to demonstrate increased trunk control for progression to transfers  Short term goal 3: PT to assess transfers  Long term goals  Time Frame for Long term goals : no LTGs set secondary to short ELOS    Evaluation Complexity: Based on the findings of patient history, examination, clinical presentation, and decision making during this evaluation, the evaluation of Patricio Lyons  is of medium complexity. PT G-Codes  Functional Assessment Tool Used: professional judgement  Functional Limitation: Mobility: Walking and moving around  Mobility: Walking and Moving Around Current Status (): At least 80 percent but less than 100 percent impaired, limited or restricted  Mobility: Walking and Moving Around Goal Status ():  At least 60 percent but less than 80 percent impaired, limited or restricted

## 2017-10-17 NOTE — PLAN OF CARE
Problem: Nutrition  Goal: Optimal nutrition therapy  Outcome: Ongoing  Nutrition Problem: Severe malnutrition  Intervention: Food and/or Nutrient Delivery: Continue current Tube Feeding  Nutritional Goals: TF to provide 80% of nutrient needs until pt. able to transition to adequate oral intake.

## 2017-10-17 NOTE — PROGRESS NOTES
Patient currently resting quietly in bed. No family present. Will attempt to stop back to see family.

## 2017-10-17 NOTE — FLOWSHEET NOTE
0 Mrs Kim Keane has been approached by Palliative care and the staff about his current therapy. Dr Mariaa Arreaga is concerned about him not improving and that we may be treating him unethically by continuing aggressive treatment. She has called her family and they are on their way here from [place]. The Palliative care nurse Mariah Luna) has graciously agreed to stay and talk with the family when they arrive. At this point, we will continue his current plan of care.

## 2017-10-17 NOTE — PLAN OF CARE
Problem: Falls - Risk of  Goal: Absence of falls  Outcome: Met This Shift  Pt had no fall this shift. Frequent checks and hourly rounds to assess needs. Problem: Skin Integrity - Impaired  Goal: Wound size and drainage will decrease and surrounding tissue/skin remains healthy and intact  Outcome: Ongoing  Continue flexiseal, desenex powder and EPC cream to rectal area. Keep stump incision clean and dry. Mepilex dressing continued to stump site. Problem: Nutrition  Goal: Optimal nutrition therapy  Outcome: Ongoing  Pt tolerating tube feeding well. Swallow eval done. Ordered honey thickened liquids and pureed food but pt continues to have congested cough and mucous in back of throat and did not feel safe trying to feed pt. Will reassess tomorrow    Problem: Discharge Planning:  Goal: Discharged to appropriate level of care  Discharged to appropriate level of care   Outcome: Ongoing  Plan to return to nursing home when stable. Reassess daily.  to assist with discharge planning    Problem: Gas Exchange - Impaired:  Goal: Levels of oxygenation will improve  Levels of oxygenation will improve   Outcome: Ongoing  Oxygenating well on room air but pt continues to have thick secretions in back of throat and coarse crackles throughout lungs. Continue percussion therapy. Turn and reposition every 2 hours and PRN and encourage coughing and deep breathing    Problem: Infection, Septic Shock:  Goal: Will show no infection signs and symptoms  Will show no infection signs and symptoms   Outcome: Ongoing  Pt continues to have low grade temp. Continue antibiotics as ordered.  Monitor temp and WBC    Problem: DISCHARGE BARRIERS  Goal: Patient's continuum of care needs are met  Outcome: Ongoing

## 2017-10-17 NOTE — PROGRESS NOTES
Renal Progress Note    Assessment and Plan: 1. Acute kidney injury resolving and mostly pre-renal azotemia  2. Hypernatremia improved  3. Leukocytosis  4. Anemia  5. Deconditioning  6. Dementia  7. Clostridium difficile colitis  8. Hypotension stable  PLAN:   labs reviewed  Medications reviewed   Discontinue current intravenous fluid  5% dextrose with 0.45 saline at 75 ml per hour  Increased midodrine from 5 mg 3 times a day to 10 mg 3 times a day  Labs in the morning  Discussed with the staff    Patient Active Problem List:     Depression     GERD (gastroesophageal reflux disease)     Cerebrovascular accident, hemorrhagic ([address]     Multiple fractures of ribs of right side     Obesity (BMI 30-39. 9)     Obstructive sleep apnea treated with BiPAP     Back pain     Fall     Unable to ambulate     Dysphagia     Acute midline low back pain without sciatica     Inability to walk     Mental confusion     Late onset Alzheimer's disease with behavioral disturbance  and mental confusion     Chest wall pain  left lower chest     Dyspnea and respiratory abnormalities     Sepsis ([address]     Acute respiratory distress     Hematemesis     Blood poisoning ([address]     Pneumonia of both lower lobes due to infectious organism     Severe malnutrition (HCC)     Acute respiratory failure with hypoxia (HCC)     Acute kidney injury ([address]     Hyperkalemia     Hypocalcemia     Urinary tract infection without hematuria     Aspiration pneumonia of both lower lobes due to gastric secretions (HCC)     Coffee ground emesis     Benign prostatic hyperplasia     COPD without exacerbation (HCC)     Unilateral AKA, right (HCC)     History of arterial occlusion     Ileus (HCC)     C. difficile diarrhea     Hypokalemia     Metabolic acidosis     Hypophosphatemia      Subjective:   Admit Date: 10/12/2017    Interval History: Late entry  Seeing for acute kidney injury and hyponatremia  Very sleepy when seen this morning  I was updated by the

## 2017-10-17 NOTE — PROGRESS NOTES
[address] THERAPY  STR CCU 3A    TIME   SLP Individual Minutes  Time In: 0915  Time Out: 1557  Minutes: 13          [x]Daily Note  []Progress Note  []Discharge Note    Date: 10/17/2017  Patient Name: Vesta Gillespie        MRN: [id number]    : 1940  (76 y.o.)  Gender: male   Primary Provider: Ruthann Garcia MD  Admitting Diagnosis:  AMS and emesis  Secondary Diagnosis: Dysphagia  Precautions: Aspiration  Swallowing Status/Diet: Dysphagia I puree with honey thick liquids  Swallowing Strategies: Full upright position, small bite/sip, no straw, direct 1:1 supervision, limit distractions  DATE of last MBS:  N/a, BSE completed 10/16/17    Subjective: Pt awake with fluctuating alertness. No observations of pain or discomfort. SAJAN Dos Santos and Rebecca indicated no provision of PO intake due to decreased mentation overnight. RN conveyed pt with increased congestion and unable to produce productive cough. SHORT TERM GOAL #1:  Goal 1: Pt will tolerate puree and nectar thick diet without overt aspiration to ensure safe and adequate nutrition and hydration. INTERVENTIONS: Edge of bed positioning with assistance from [employer] and Rebecca. Marked limitations in mentation at date with pt unable to keep eyes open or follow simple commands. Presented honey thick viscosity H20 via tsp x1 with pt unable to elicit swallow trigger despite max multi-modal cues employed. Due to decreased alertness, recommend NPO with continued enteral feeds via NG tube. MD Nicko Deng notified of diet change with verbal agreement; SAJAN Dos Santos educated with recommendations. Repeat BSE at subsequent tx date upon improved mentation in attempt to establish safe diet level. SHORT TERM GOAL #2:  Goal 2: Pt will tolerate trials of advanced solids and liquids without overt aspiration 10/10 times to advance pt to previous diet level.     INTERVENTIONS: DNT; see STG #1.         ASSESSMENT:  Assessment: []Progressing towards

## 2017-10-17 NOTE — PROGRESS NOTES
changed to meropenem, and once C.  Dif was positive, ABX changed to Tegecycline and vancomycin  PO via NG tube, urine out put is low, nephrology consulted and IV fluids changed to bicarb drip for metabolic acidosis and renal failure, replace potassium, BP still on low side, hemoglobin stable    10/16- renal failure improved, acidosis resolved, sod bicarb drip changed to D5, electrolytes being replaced, patient little drowsy today unclear if he didn't sleep last night, still has watery diarrhea, or rhonchi present in the lungs, was having SVTs yesterday, started on low-dose beta blocker, Will try to sit him up in the chair when awake, physical therapy ordered, continue antibiotics, will need swallowing eval once little more alert, WBC still trending up in spite of antibiotics, will add flagyl,  If not improving and failing swallowing eval, may consider hospice in the next couple of days    10/17- WBC still trending up, 37,500, abdominal x-ray still shows persistent ileus, tolerating NG tube well, so far no nausea, on exam more lethargic, unable to cough up secretions, extensive rhonchi, abdominal pain, bowel sounds hypoactive, poor prognosis, will discuss with family about possible hospice, nursing staff mentioned about small drainage from the right groin, last night, currently does not have any drainage, sent for cultures, blood cultures negative so far, oxygen tapered off, renal function improved, yesterday did good with swallowing eval but today he is very lethargic    Subjective:  Patient is more lethargic today, in the last couple of days she was able to at least make up today he is hardly able to open eyes    Medications:  Reviewed    Infusion Medications    dextrose 75 mL/hr at 10/16/17 1935    dextrose       Scheduled Medications    midodrine  5 mg Oral TID     metroNIDAZOLE  500 mg Intravenous Q8H    sodium chloride  1,000 mL Intravenous Once    famotidine  20 mg Oral Daily    tigecycline (TYGACIL) IVPB  50 mg Intravenous Q12H    vancomycin  250 mg Oral 4 times per day    potassium (CARDIAC) replacement protocol   Other RX Placeholder    metoprolol tartrate  12.5 mg Oral BID    calcium replacement protocol   Other RX Placeholder    phosphorus replacement protocol   Other RX Placeholder    magnesium replacement protocol   Other RX Placeholder    ferrous sulfate  325 mg Oral Daily with breakfast    finasteride  5 mg Oral Daily    folic acid  1 mg Oral Daily    ipratropium-albuterol  3 mL Inhalation Q4H WA    lactobacillus  1 capsule Oral BID    levothyroxine  100 mcg Oral Daily    traZODone  50 mg Oral Nightly    sodium chloride flush  10 mL Intravenous 2 times per day    dextrose  25 g Intravenous Once    insulin regular  8 Units Intravenous Once    miconazole   Topical BID     PRN Meds: potassium chloride **OR** potassium chloride **OR** potassium chloride, potassium chloride, acetaminophen, bisacodyl, LORazepam, nitroGLYCERIN, oxyCODONE, sodium chloride flush, magnesium hydroxide, ondansetron, glucose, dextrose, glucagon (rDNA), dextrose, acetaminophen      Intake/Output Summary (Last 24 hours) at 10/17/17 0355  Last data filed at 10/17/17 0500   Gross per 24 hour   Intake          4009.15 ml   Output             1500 ml   Net          2509.15 ml       Diet:  Diet Tube Feed Continuous/Cyclic w/ Diet  DIET DYSPHAGIA I PUREED;  Honey Thick    Exam:  /62   Pulse 86   Temp 99.9 °F (37.7 °C) (Core)   Resp 28   Ht 5' 4\" (1.626 m)   Wt 167 lb 1.7 oz (75.8 kg)   SpO2 100%   BMI 28.68 kg/m²     Physical Examination:   General appearance -lethargic, little tachypneic,, NG tube present  HEENT: Normocephalic, Atraumatic, pupils reactive, mucous membranes dry  Chest - moving equally to respiration,symmetric air entry, still has rhonchi bilateral on auscultation,little more prominent  Heart - tachycardiac, regular rhythm, normal S1, S2, no murmurs  Abdomen - soft, diffuse tender, nondistended, worsening bibasilar atelectasis/infiltrate. **This report has been created using voice recognition software. It may contain minor errors which are inherent in voice recognition technology. **      Final report electronically signed by Dr. Monse Leonardo on 10/14/2017 8:43 AM      US RENAL COMPLETE   Final Result   1. Possible nonobstructive right-sided nephrolithiasis or prominent renal sinus fat. 2. Probable left renal cyst.   3. Conet catheter in a nondistended urinary bladder. Final report electronically signed by Dr. Monse Leonardo on 10/14/2017 8:31 AM      XR Abdomen G Tube Placement   Final Result   Esophageal route tube tip in the stomach. **This report has been created using voice recognition software. It may contain minor errors which are inherent in voice recognition technology. **      Final report electronically signed by Dr. Anna Mendez on 10/13/2017 3:28 PM      XR Chest Single For Ng Tube Placement   Final Result   1. Position of the NG tube is uncertain at this time. Uncertain if this is actually below the diaphragm or above the diaphragm. 2. A routine mobile view of the chest with standard positioning would be helpful for further evaluation. **This report has been created using voice recognition software. It may contain minor errors which are inherent in voice recognition technology. **      Final report electronically signed by Dr. Vignesh Mills on 10/13/2017 1:21 PM      XR ABDOMEN (KUB) (SINGLE AP VIEW)   Final Result   1. Findings consistent with mild ileus. 2.  Constipation and possible fecal impaction within the rectum are possible. *This report has been created using voice recognition software. It may contain minor errors which are inherent in voice recognition technology. **      Final report electronically signed by Dr. Anna Mendez on 10/13/2017 10:10 AM      CT CHEST WO CONTRAST   Final Result   1.  There are patchy infiltrates within suppository and also start NG tube feeding  - has diarrhea  And xray shows persistent ileus , on low dose NG tube feeding, tolerating well      SVT, on low-dose beta blocker    Hypocalcemia, replacement protocol      Elevated troponin- Unclear clinical significance    Coffee-ground emesis? Gastris vs bleed, continue Protonix, monitor hemoglobin    HX of recent  C. Diff dairrhea    Alzheimer's disease with behavioral disturbance- chronic,    History of stroke ×2, more than 10 years ago, foot drop on the left    COPD- bronchodilators  GERD- PPI  Sleep apnea  Hypothyroidism- TSH is little elevated    HX of atrial occulusion s/p right AKA    Benign prostate hypertrophy- history of suprapubic catheter- Currently Foleys placed         Resolved    Acute kidney injury- from dehydration and hypotension and sepsis, continue IV hydration, creatinine, around 1.9, nephrology consulted on 10/15, started on bicarb drip, oliguric, sig positive fluid balance  - 10/16, improved    Acute respiratory failure, hypoxia ?  Pneumonia versus pulmonary edema, cannot rule out PE:  Most likely from pneumonia, aspiration, continue oxygen supplementation    Hyperkalemia- improving with Late, trended down from 6.6-5.4, and is low now    Hypokalemia- repalced  Hypophosphatemia- replacement protocol  Lactic acidosis- still little high, continue IV hydration and antibiotics        Electronically signed by Yao Tolentino MD on 10/17/2017 at 6:19 AM

## 2017-10-17 NOTE — PROGRESS NOTES
5.0   Protein, UA Latest Ref Range: NEGATIVE mg/dl 100 (A)   Urobilinogen, Urine Latest Ref Range: 0.0 - 1.0 eu/dl 0.2   Nitrite, Urine Latest Ref Range: NEGATIVE  POSITIVE (A)   Leukocyte Esterase, Urine Latest Ref Range: NEGATIVE  MODERATE (A)   Casts Latest Ref Range: NONE SEEN /lpf 0-4 HYALINE   WBC, UA Latest Ref Range: 0-4/hpf /hpf    RBC, UA Latest Ref Range: 0-2/hpf /hpf 15-20   Epi Cells Latest Ref Range: 3-5/hpf /hpf 0-2   Renal Epithelial, Urine Latest Ref Range: NONE SEEN  NONE SEEN   Bacteria, UA Latest Ref Range: FEW/NONE S  MODERATE   Yeast, Urine Latest Ref Range: NONE SEEN  NONE SEEN   Crystals Latest Ref Range: NONE SEEN  NONE SEEN   Ictotest Latest Ref Range: NEGATIVE  NEGATIVE   Character, Urine Latest Ref Range: CLR-SL.BRODIE  TURBID (A)   Chloride, Urine Latest Units: meq/l 20.0   Creatinine, Urine Latest Units: mg/dl 241.6   Osmolality, Ur Latest Ref Range: 250 - 750 mosmol/kg 388   Potassium, Urine Latest Units: meq/l 86.3   Sodium, Ur Latest Units: meq/l 24     MICRO:   Cultures no growth    IMAGING:   10/14 cxr  Impression   Slightly worsening bibasilar atelectasis/infiltrate.             Objective:   Vitals: BP 96/80   Pulse 85   Temp 99.9 °F (37.7 °C) (Core)   Resp 28   Ht 5' 4\" (1.626 m)   Wt 167 lb 1.7 oz (75.8 kg)   SpO2 93%   BMI 28.68 kg/m²    HEENT: Head: Normocephalic, no lesions, without obvious abnormality. Lungs: dyspneic -- crackles in bases posteriorly   Heart: S1, S2 normal  Abdomen: soft, non-tender; bowel sounds normal; no masses,  no organomegaly NG +   Extremities: extremities normal, atraumatic, no cyanosis or edema  Neurologic: Mental status:more alert    Wound/Ulcers: right AKA site stable        Assessment:   1. Severe sepsis with shock. 2.  Aspiration pneumonia. 3.   S/P Acute respiratory failure. 4.   dysphagia   5. History of prostate problems with BPH.   6.  Peripheral vascular disease, status post AKA done 2 months prior  to this hospitalization. 7.  Dementia. 8.  Acute kidney injury. 9.  COPD. 10.   C. difficile     Patient Active Problem List:     Depression     GERD (gastroesophageal reflux disease)     Cerebrovascular accident, hemorrhagic ([address]     Multiple fractures of ribs of right side     Obesity (BMI 30-39. 9)     Obstructive sleep apnea treated with BiPAP     Back pain     Fall     Unable to ambulate     Dysphagia     Acute midline low back pain without sciatica     Inability to walk     Mental confusion     Late onset Alzheimer's disease with behavioral disturbance  and mental confusion     Chest wall pain  left lower chest     Dyspnea and respiratory abnormalities     Sepsis ([address]     Acute respiratory distress     Hematemesis     Blood poisoning ([address]     Pneumonia of both lower lobes due to infectious organism     Severe malnutrition (HCC)     Acute respiratory failure with hypoxia (HCC)     Acute kidney injury ([address]     Hyperkalemia     Hypocalcemia     Urinary tract infection without hematuria     Aspiration pneumonia of both lower lobes due to gastric secretions (HCC)     Coffee ground emesis     Benign prostatic hyperplasia     COPD without exacerbation (HCC)     Unilateral AKA, right ([address]     History of arterial occlusion      Plan:  Continue antibiotics with tygacil and po vanco   On flagyl as well  Aspiration precautions  Follow stool output     Noted scribed in real time of face to face encounter by Dr Mary Garcia, transcribed in [facility] after rounds were completed by Jessica Trevino CNP. Electronically signed by Blake Hogan CNP on 10/17/2017 at 9:27 AM    I have reviewed and agree with the above note. The information is true and accurate.    Electronically signed by Stephanie Wayne MD on 10/17/17 at 6:21 PM

## 2017-10-17 NOTE — PROGRESS NOTES
Nutrition Assessment    Type and Reason for Visit: Reassess    Nutrition Recommendations: Continue current TF. Malnutrition Assessment:  · Malnutrition Status: Meets the criteria for severe malnutrition  · Context: Acute illness or injury  · Findings of the 6 clinical characteristics of malnutrition (Minimum of 2 out of 6 clinical characteristics is required to make the diagnosis of moderate or severe Protein Calorie Malnutrition based on AND/ASPEN Guidelines):  1. Energy Intake-Less than or equal to 50%, greater than or equal to 5 days    2. Weight Loss- (15% wt loss in 3 months not counting AKA),    3. Muscle Loss-Severe muscle mass loss, Temples (temporalis muscle)    Nutrition Diagnosis:   · Problem: Severe malnutrition  · Etiology: related to Catabolic illness     Signs and symptoms:  as evidenced by Severe muscle loss, Diet history of poor intake (15% wt loss in 3 months)    Nutrition Assessment:  · Subjective Assessment: Pt. seen. He is sleeping. Shanae Novak RN mentions pt. no longer on diet due to mental status. 425ml rectal tube output noted. Meds: include: Ferrous Sulphate, Oscal, pepcid, Folvite, Regular Insulin, Flagyl. C Diff + (10/15). cLabs include: (10/17) BUN 52, Creatinine 1.0, Ca Ion 0.95, Ca 7.3, (10/16) Hemoglobin 10.8.   ·    · Wound Type:  (10/13 per Shanae Novak RN pt with some excoriation around coccyx  & AKA stump healing well. )  · Current Nutrition Therapies:  · Oral Diet Orders: NPO   · Oral Diet intake: NPO  · Oral Nutrition Supplement (ONS) Orders: None  · ONS intake: NPO  · Tube Feeding (TF) Orders:   · Feeding Route: Nasogastric  · Formula: Renal (Nepro)  · Rate (ml/hr):40ml/hr    · Volume (ml/day): 960ml ( 698ml water from TF)  · Duration: Continuous 24hrs  · TF Residuals: Not applicable  · Water Flushes:  (when IVF stopped, suggest 260ml free water flush 4 times per day (TF + water flush to yield 1738ml 25ml/kgm admit wt. of 69.1kgm))  · Goal TF & Flush Orders Provides: 1728 kcals, 78 grams protein & 155 grams CHO/24 hours  · Additional Calories:    · Anthropometric Measures:  · Ht: 5' 4\" (162.6 cm)   · Current Body Wt: 167 lb 1.7 oz (75.8 kg)  · Admission Body Wt: 152 lb 5.4 oz (69.1 kg) (10/13)  · Usual Body Wt:  (7/15/17 pt weighed 205#  & had AKA in  September  & continued to lose wt.  pt weighed ~180-190# afer AKA with speaking to daughter)  · % Weight Change: 19% in 3 months not counting AKA,     · Ideal Body Wt: 120 lb (54.4 kg) (adjusted for AKA),  BMI Classification: BMI 30.0 - 34.9 Obese Class I  · Comparative Standards (Estimated Nutrition Needs):  · Estimated Daily Total Kcal: ~1727 kcals ( 25 kcals/kg on admit wt of 69.1 kg)  · Estimated Daily Protein (g): ~70 grams ( 1 gram protein/kg on admit wt of 69.1 kg)  · Estimated Daily Fluid (ml/day):      Estimated Intake vs Estimated Needs: Intake Improving    Nutrition Risk Level: High    Nutrition Interventions:   Continue current Tube Feeding  Continued Inpatient Monitoring, Education not appropriate at this time    Nutrition Evaluation:   · Evaluation: Progressing toward goals   · Goals: TF to provide 80% of nutrient needs until pt. able to transition to adequate oral intake. · Monitoring: TF Intake, TF Tolerance, Gastric Residuals, Skin Integrity, Wound Healing, Mental Status/Confusion, Weight, Comparative Standards, Pertinent Labs, Chewing/Swallowing, Diarrhea    See Adult Nutrition Doc Flowsheet for more detail.      Electronically signed by Corina Terrell RD, LD on 10/17/17 at 10:35 AM    Contact Number: [phone]

## 2017-10-17 NOTE — PROGRESS NOTES
[facility]  OCCUPATIONAL THERAPY MISSED TREATMENT NOTE  ACUTE CARE    Date: 10/17/2017  Patient Name: Nathalie Morris        CSN: [id number]   : 1940  (76 y.o.)  Gender: male                REASON FOR MISSED TREATMENT:  Missed Treat. Per nurse Pt has had a decline in medical status, nurses are trying to contact family regarding POC. Will check back 10/18/17 to see POC.

## 2017-10-18 VITALS
HEART RATE: 89 BPM | RESPIRATION RATE: 25 BRPM | BODY MASS INDEX: 28.53 KG/M2 | OXYGEN SATURATION: 93 % | HEIGHT: 64 IN | SYSTOLIC BLOOD PRESSURE: 94 MMHG | DIASTOLIC BLOOD PRESSURE: 66 MMHG | TEMPERATURE: 97 F | WEIGHT: 167.11 LBS

## 2017-10-18 LAB
BLOOD CULTURE, ROUTINE: NORMAL
BLOOD CULTURE, ROUTINE: NORMAL

## 2017-10-18 PROCEDURE — 6360000002 HC RX W HCPCS: Performed by: FAMILY MEDICINE

## 2017-10-18 PROCEDURE — 99239 HOSP IP/OBS DSCHRG MGMT >30: CPT | Performed by: FAMILY MEDICINE

## 2017-10-18 RX ORDER — ACETAMINOPHEN 650 MG/1
650 SUPPOSITORY RECTAL EVERY 4 HOURS PRN
Qty: 60 SUPPOSITORY | Refills: 3 | DISCHARGE
Start: 2017-10-18

## 2017-10-18 RX ORDER — ACETAMINOPHEN 160 MG/5ML
650 SOLUTION ORAL EVERY 4 HOURS PRN
Qty: 473 ML | Refills: 3 | DISCHARGE
Start: 2017-10-18

## 2017-10-18 RX ADMIN — MORPHINE SULFATE 2 MG: 2 INJECTION, SOLUTION INTRAMUSCULAR; INTRAVENOUS at 14:23

## 2017-10-18 ASSESSMENT — PAIN SCALES - PAIN ASSESSMENT IN ADVANCED DEMENTIA (PAINAD)
FACIALEXPRESSION: 0
CONSOLABILITY: 0
NEGVOCALIZATION: 0
TOTALSCORE: 0
FACIALEXPRESSION: 0
FACIALEXPRESSION: 0
BODYLANGUAGE: 0
BODYLANGUAGE: 0
CONSOLABILITY: 0
FACIALEXPRESSION: 0
CONSOLABILITY: 0
BREATHING: 0
TOTALSCORE: 0
FACIALEXPRESSION: 0
TOTALSCORE: 0
BODYLANGUAGE: 0
BREATHING: 0
NEGVOCALIZATION: 0
BODYLANGUAGE: 0
FACIALEXPRESSION: 0
FACIALEXPRESSION: 0
BODYLANGUAGE: 0
FACIALEXPRESSION: 0
BODYLANGUAGE: 0
BREATHING: 0
BODYLANGUAGE: 0
FACIALEXPRESSION: 0
TOTALSCORE: 0
CONSOLABILITY: 0
BREATHING: 0
BREATHING: 0
CONSOLABILITY: 0
FACIALEXPRESSION: 0
TOTALSCORE: 0
BREATHING: 0
NEGVOCALIZATION: 0
CONSOLABILITY: 0
FACIALEXPRESSION: 0
NEGVOCALIZATION: 0
BREATHING: 0
TOTALSCORE: 0
BODYLANGUAGE: 0
BREATHING: 0
BREATHING: 0
CONSOLABILITY: 0
NEGVOCALIZATION: 0
CONSOLABILITY: 0
TOTALSCORE: 0
BREATHING: 0
CONSOLABILITY: 0
TOTALSCORE: 0
BODYLANGUAGE: 0
TOTALSCORE: 0
NEGVOCALIZATION: 0
BREATHING: 0
NEGVOCALIZATION: 0
TOTALSCORE: 0
BODYLANGUAGE: 0
TOTALSCORE: 0
BODYLANGUAGE: 0
BREATHING: 0
FACIALEXPRESSION: 0
CONSOLABILITY: 0
NEGVOCALIZATION: 0
BREATHING: 0
CONSOLABILITY: 0
CONSOLABILITY: 0
TOTALSCORE: 0
TOTALSCORE: 0
FACIALEXPRESSION: 0
BODYLANGUAGE: 0
BREATHING: 0
CONSOLABILITY: 0
NEGVOCALIZATION: 0
BREATHING: 0
NEGVOCALIZATION: 0
NEGVOCALIZATION: 0
TOTALSCORE: 0
NEGVOCALIZATION: 0
TOTALSCORE: 0
FACIALEXPRESSION: 0
FACIALEXPRESSION: 0
CONSOLABILITY: 0
CONSOLABILITY: 0

## 2017-10-18 NOTE — PLAN OF CARE
Problem: Falls - Risk of  Goal: Absence of falls  Outcome: Met This Shift  Patient is confused, not lucid, but also does not try to get up as he is so ill  But, bed alarm still uitilized even with wife at bedside, hourly rounding continued    Problem: Skin Integrity - Impaired  Goal: Wound size and drainage will decrease and surrounding tissue/skin remains healthy and intact  Outcome: Not Met This Shift  Perineum has been kept clean and protective cream utilized to prevent further skin damage from watery stools    Problem: Nutrition  Goal: Optimal nutrition therapy  Outcome: Completed Date Met: 10/18/17  Patient is now just DNR comfort care  Not awake enough to eat  Tube feedings were discontinued  Family will be talking with Hospice today    Problem: Discharge Planning:  Goal: Discharged to appropriate level of care  Discharged to appropriate level of care   Outcome: Ongoing  Family will be talking to Hospice today  Goal: Participates in care planning  Participates in care planning   Outcome: Not Met This Shift  Patient is obtunded, confused and not able to voice his wishes at this time    Problem: Gas Exchange - Impaired:  Goal: Levels of oxygenation will improve  Levels of oxygenation will improve   Outcome: Not Met This Shift  Patient's condition will continue to deteriorate as he gets closer to demise    Problem: Infection, Septic Shock:  Goal: Will show no infection signs and symptoms  Will show no infection signs and symptoms   Outcome: Not Met This Shift  Infectious processes are continuing to worsen    Problem: DISCHARGE BARRIERS  Goal: Patient's continuum of care needs are met  Outcome: Ongoing  Wife is at bedside, seems to understand patient's condition much more so than patient's children    Problem: Musculor/Skeletal Functional Status  Goal: Highest potential functional level  Outcome: Not Met This Shift  This won't be met as [facility] is going to take patient home

## 2017-10-18 NOTE — PLAN OF CARE
Problem: Falls - Risk of  Goal: Absence of falls  Outcome: Ongoing  Remains in bed this shift. No falls. Side rails up and bed alarm on. Family at bedside. Problem: Discharge Planning:  Goal: Discharged to appropriate level of care  Discharged to appropriate level of care   Outcome: Ongoing  Discharge planning in process and discussed with patient/family. Social work consulted for any additional needs. Care manager aware of discharge needs. Plan is for patient to [facility] with hosp[facility] care. Problem: Gas Exchange - Impaired:  Goal: Levels of oxygenation will improve  Levels of oxygenation will improve   Outcome: Ongoing  Pt remains on room air, O2 sats remain above 92%. Will continue to monitor. Problem: Musculor/Skeletal Functional Status  Goal: Highest potential functional level  Outcome: Ongoing  Pt has generalized weakness. Unable to follow commands. Will continue to monitor. Care plan reviewed with patient and family. Patient and family verbalize understanding of the plan of care and contribute to goal setting.

## 2017-10-18 NOTE — CARE COORDINATION
10/18/17, 11:37 AM    Discharge plan discussed by  and . Discharge plan reviewed with patient/ family. Patient/ family verbalize understanding of discharge plan and are in agreement with plan. Understanding was demonstrated using the teach back method. Services After Discharge  Services At/After Discharge: In ambulance, Hospice   IMM Letter  IMM Letter date given[de-identified] 10/18/17  IMM Letter time given[de-identified] 0 (Wife does not want to wait four hours, she is agreeable with discharge and would like that to happen before the 4 hour wait period. )     LANCE notified Ralf Haas with [facility] of discharge today with [facility]. Ralf Haas updated that Hospice will sign pt onto services 10/19 @ 9:30 am - if pt has any changes or concerns throughout the night the ECF is to contact [facility]. Family is agreeable. Blue envelope on chart with completed transport forms, phone/fax numbers. SAJAN Pastrana aware. LANCE will assist with discharge, await physician to round for orders. 1:21 PM  LANCE copied and faxed AVS along with scripts. [facility] for transport at 2:00pm.  SAJAN Pastrana aware.

## 2017-10-18 NOTE — DISCHARGE SUMMARY
Hospitalist Discharge Summary     Patient Identification:  Rod Avitia  : 1940  MRN: [id number]   Account: [de-identified]     Admit date: 10/12/2017  Discharge date: 10/18/2017    Attending provider: Latisha Malone MD        Primary care provider: Demarco Sun MD     Discharge Diagnoses:   1. Severe Sepsis  - ?UTI vs pneumonia  2. Metabolic encephalopathy  3. Leukocytosis - worsening  4. Acute respiratory failure with hypoxia (HCC)  5.   C diff diarrhea  6. Acute kidney injury ([address]  7. Hyperkalemia  8. Hypocalcemia  9. Urinary tract infection without hematuria  10. Aspiration pneumonia of both lower lobes due to gastric secretions ([address]  11. Ileus ([address]  12. Hypokalemia  13. Metabolic acidosis/lactic acidosis  14. Hypophosphatemia    15. SVT  16.   Elevated troponin  17. Normocytic anemia  18. Dysphagia  19. Grade 1 diastolic dysfunction  20.   ?hematemesis with coffee ground emesis - occult blood negative  21. Alzheimer's disease with behavioral disturbance (chronic)  22. Benign prostatic hyperplasia with hx of suprapubic catheter  23. COPD without exacerbation ([address]  24. Unilateral AKA, right ([address]  25. History of arterial occlusion  26. History of stroke with foot drop on left  27. GERD  28. Hypothyroidism  29. CINTHIA  30. Severe malnutrition  31. Debility       Hospital Course:   Rod Avitia is a 76 y.o. male admitted to [address] [place] [address] on 10/12/2017 for AMS and coffee ground emesis. See H&P by Chris Aviles MD on 10/13/17 for admitting details.      Patient was only seen on day of d/c.    Per Dr. Marian Kimball note \"Patient was transferred from nursing home for further evaluation of change in mental status, tachypnea and hypoxia with oxygen saturation in the 70s.  Apparently family physician was called by the nursing home last night saying that the patient was having coffee ground emesis and she was trying to manage him at the nursing home but patient continued to worsen as he became tachypneic and tachycardic and EMS was called and apparently patient was noticed to be hypotensive with blood pressure in 80s and a liter of bolus was given there.      Vitals here showed blood pressure of 117/32, respiratory rate of 42, heart rate of 128, temperature of 103 an oxygen saturation of 86% on 6 L.  ABG showed pH of 7.39, PCO2 of 45, PO2 of 66 on 6 L. Labs showed elevated WBC of 28,800 with bandemia, lactic acidosis, potassium of 5.9. Urine analysis showed positive nitrite and leukocyte esterase. Chest x-ray suggested interstitial edema. PT was admitted to CCU for further care.      Patient was started on broad-spectrum antibiotics, discussed with wife and she did not want intubation but wanted him to be treated medically, also got updated from PCP about his past medical problems. 10/14- patient coughing up little coffee ground material, possibly from previous aspiration, still tachypneic, BP on little low side, continue IVF and ABX, NG tube, will start NG tube feeding, has good BM's more liquidy  From kayexalate, urine out put not documented well as per RN, suspected oliguria but patient had good urine output as per nursing staff, has rhonchi in the lungs, continue current treatment     10/15- had a lot of diarrhea and was positive for C. Diff, initially ABX changed to meropenem, and once C.  Dif was positive, ABX changed to Tegecycline and vancomycin  PO via NG tube, urine out put is low, nephrology consulted and IV fluids changed to bicarb drip for metabolic acidosis and renal failure, replace potassium, BP still on low side, hemoglobin stable     10/16- renal failure improved, acidosis resolved, sod bicarb drip changed to D5, electrolytes being replaced, patient little drowsy today unclear if he didn't sleep last night, still has watery diarrhea, or rhonchi present in the lungs, was having SVTs yesterday, started on low-dose beta blocker, lab work: none  Activity: activity as tolerated  Diet:      Code Status: DNR-CC    Follow-up visits:   Yoli Grigsby MD  [address]  [address] [facility] [address] [address] [address] [facility] [id number] [phone]           Procedures: none    Consults:   PHARMACY TO DOSE VANCOMYCIN  IP CONSULT TO INFECTIOUS DISEASES  PALLIATIVE CARE EVAL  IP CONSULT TO PULMONOLOGY  IP CONSULT TO SPIRITUAL SERVICES  IP CONSULT TO DIETITIAN  IP CONSULT TO SOCIAL WORK  IP CONSULT TO NEPHROLOGY  PALLIATIVE CARE EVAL  IP CONSULT TO SPIRITUAL SERVICES  IP CONSULT TO HOSPICE      Examination:  Vitals:  Vitals:    10/18/17 0502 10/18/17 0602 10/18/17 0702 10/18/17 0803   BP:       Pulse: 84 81 81 87   Resp: 22 19 22 28   Temp: 99 °F (37.2 °C) 98.8 °F (37.1 °C) 98.2 °F (36.8 °C) 97 °F (36.1 °C)   TempSrc: Core Core Core Core   SpO2:    94%   Weight:       Height:         Weight: Weight: 167 lb 1.7 oz (75.8 kg)     24 hour intake/output:  Intake/Output Summary (Last 24 hours) at 10/18/17 1205  Last data filed at 10/18/17 1080   Gross per 24 hour   Intake          1708.08 ml   Output              775 ml   Net           933.08 ml       General appearance - chronically ill appearing and wakes and calm, will follow commands  Chest - few scattered upper airway rhonchi  Heart - normal rate and regular rhythm, S1 and S2 normal  Abdomen - soft, nontender, nondistended, no masses or organomegaly  bowel sounds normal  Obese: No; Protuberant: No   Neurological - no focal deficits  Extremities - right AKA, +edema LLE, no TTP  Skin - normal coloration and turgor, no rashes, no suspicious skin lesions noted    Significant Diagnostics:   Radiology:   XR ABDOMEN (KUB) (SINGLE AP VIEW)   Final Result   Moderate diffuse paralytic ileus. No appreciable change from prior. **This report has been created using voice recognition software.   It may contain minor errors which are inherent in voice recognition technology. **      Final report electronically signed by Dr. Shoshana Poe on 10/17/2017 11:57 AM      XR Chest Portable   Final Result   1. Poor inflation of the lungs. Borderline heart size. Prior anterior cervical fusion. Esophageal tube present and passes into stomach. 2. Mild bibasilar atelectasis/pneumonia. Slight improvement from prior. **This report has been created using voice recognition software. It may contain minor errors which are inherent in voice recognition technology. **      Final report electronically signed by Dr. Shoshana Poe on 10/17/2017 11:54 AM      XR Chest Portable   Final Result   Slightly improving bibasilar atelectasis/infiltrate. **This report has been created using voice recognition software. It may contain minor errors which are inherent in voice recognition technology. **      Final report electronically signed by Dr. Madisyn Vitale on 10/15/2017 9:05 AM      XR ABDOMEN (KUB) (SINGLE AP VIEW)   Final Result   Prominent gas-filled large and small bowel loops with distal bowel gas consistent with improving paralytic ileus. Final report electronically signed by Dr. Madisyn Vitale on 10/14/2017 9:07 AM      XR Chest Portable   Final Result   Slightly worsening bibasilar atelectasis/infiltrate. **This report has been created using voice recognition software. It may contain minor errors which are inherent in voice recognition technology. **      Final report electronically signed by Dr. Madisyn Vitale on 10/14/2017 8:43 AM      US RENAL COMPLETE   Final Result   1. Possible nonobstructive right-sided nephrolithiasis or prominent renal sinus fat. 2. Probable left renal cyst.   3. Conte catheter in a nondistended urinary bladder. Final report electronically signed by Dr. Madisyn Vitale on 10/14/2017 8:31 AM      XR Abdomen G Tube Placement   Final Result   Esophageal route tube tip in the stomach. **This report has been created using voice recognition software. It may contain minor errors which are inherent in voice recognition technology. **      Final report electronically signed by Dr. Justin Gomez on 10/13/2017 3:28 PM      XR Chest Single For Ng Tube Placement   Final Result   1. Position of the NG tube is uncertain at this time. Uncertain if this is actually below the diaphragm or above the diaphragm. 2. A routine mobile view of the chest with standard positioning would be helpful for further evaluation. **This report has been created using voice recognition software. It may contain minor errors which are inherent in voice recognition technology. **      Final report electronically signed by Dr. Shannon Vera on 10/13/2017 1:21 PM      XR ABDOMEN (KUB) (SINGLE AP VIEW)   Final Result   1. Findings consistent with mild ileus. 2.  Constipation and possible fecal impaction within the rectum are possible. *This report has been created using voice recognition software. It may contain minor errors which are inherent in voice recognition technology. **      Final report electronically signed by Dr. Justin Gomez on 10/13/2017 10:10 AM      CT CHEST WO CONTRAST   Final Result   1. There are patchy infiltrates within the lower lobes bilaterally suggesting a pneumonia. There are also vague radiodensities within the upper chest bilaterally measuring 0.5 cm on the right and 0.6 cm on the left suggesting either focal consolidation    or a lung nodule (series 2 image 16). 2. There is circumferential thickening of the distal esophagus. An esophagram could be performed for additional evaluation. 3. Numerous additional findings as described in the body of the report. **This report has been created using voice recognition software. It may contain minor errors which are inherent in voice recognition technology. **      Final report electronically signed by Dr. Ambika Falk on 10/13/2017 10:04 AM      XR Chest Portable   Final Result   Interstitial edema. Possible developing peribronchial infiltrates at both bases            **This report has been created using voice recognition software. It may contain minor errors which are inherent in voice recognition technology. **      Final report electronically signed by Dr. Mariana Marquis on 10/12/2017 11:00 PM          Labs:   Recent Results (from the past 72 hour(s))   Basic Metabolic Panel    Collection Time: 10/15/17  7:19 PM   Result Value Ref Range    Sodium 144 135 - 145 meq/L    Potassium 3.5 3.5 - 5.2 meq/L    Chloride 115 (H) 98 - 111 meq/L    CO2 18 (L) 23 - 33 meq/L    Glucose 135 (H) 70 - 108 mg/dL    BUN 42 (H) 7 - 22 mg/dL    CREATININE 1.3 (H) 0.4 - 1.2 mg/dL    Calcium 6.9 (L) 8.5 - 10.5 mg/dL   Anion Gap    Collection Time: 10/15/17  7:19 PM   Result Value Ref Range    Anion Gap 11.0 8.0 - 16.0 meq/L   Glomerular Filtration Rate, Estimated    Collection Time: 10/15/17  7:19 PM   Result Value Ref Range    Est, Glom Filt Rate 53 (A) ml/min/1.73m2   PT    Collection Time: 10/16/17  5:34 AM   Result Value Ref Range    INR 1.21 (H) 0.85 - 2.49   Basic Metabolic Panel    Collection Time: 10/16/17  5:34 AM   Result Value Ref Range    Sodium 148 (H) 135 - 145 meq/L    Potassium 3.5 3.5 - 5.2 meq/L    Chloride 113 (H) 98 - 111 meq/L    CO2 24 23 - 33 meq/L    Glucose 127 (H) 70 - 108 mg/dL    BUN 50 (H) 7 - 22 mg/dL    CREATININE 1.2 0.4 - 1.2 mg/dL    Calcium 6.9 (L) 8.5 - 10.5 mg/dL   Magnesium    Collection Time: 10/16/17  5:34 AM   Result Value Ref Range    Magnesium 2.1 1.6 - 2.4 mg/dL   Phosphorus    Collection Time: 10/16/17  5:34 AM   Result Value Ref Range    Phosphorus 1.6 (L) 2.4 - 4.7 mg/dL   CBC Auto Differential    Collection Time: 10/16/17  5:34 AM   Result Value Ref Range    WBC 34.6 (HH) 4.8 - 10.8 thou/mm3    RBC 3.79 (L) 4.70 - 6.10 mill/mm3    Hemoglobin 10.8 (L) 14.0 - 18.0 gm/dl    Hematocrit 34.0 (L) 42.0 - 52.0 %    MCV 89.6 80.0 - 94.0 fL    MCH 28.6 27.0 - 31.0 pg    MCHC 31.9 (L) 33.0 - 37.0 gm/dl    RDW 17.9 (H) 11.5 - 14.5 %    Platelets 575 410 - 085 thou/mm3    MPV 9.9 7.4 - 10.4 mcm    Pathologist Review SMW     Differential Type see below     RBC Morphology SL ABNORMAL     Seg Neutrophils 81.0 %    Bands 5.0 %    Lymphocytes 5.0 %    Monocytes 1.0 %    Eosinophils 0.0 %    Basophils 0.0 %    Myelocytes 6 %    Promyelocytes 2 %    nRBC 1 /100 wbc    Platelet Estimate ADEQUATE     Anisocytosis 1+     Poikilocytes SLIGHT     BASOPHILIA SLIGHT     Toxic Granulation SLIGHT     Ovalocytes RARE     Segs Absolute 28.0 (H) 1.8 - 7.7 thou/mm3    Lymphocytes # 1.7 1.0 - 4.8 thou/mm3    Monocytes # 0.3 (L) 0.4 - 1.3 thou/mm3    Eosinophils # 0.0 0.0 - 0.4 thou/mm3    Basophils # 0.0 0.0 - 0.1 thou/mm3    Bands Absolute 1.7 thou/mm3   Lactic Acid, Plasma    Collection Time: 10/16/17  5:34 AM   Result Value Ref Range    Lactic Acid 1.8 0.5 - 2.2 mmol/L   Calcium, Ionized    Collection Time: 10/16/17  5:34 AM   Result Value Ref Range    Calcium, Ion 1.08 (L) 1.12 - 1.32 mmol/L   Anion Gap    Collection Time: 10/16/17  5:34 AM   Result Value Ref Range    Anion Gap 11.0 8.0 - 16.0 meq/L   Glomerular Filtration Rate, Estimated    Collection Time: 10/16/17  5:34 AM   Result Value Ref Range    Est, Glom Filt Rate 58 (A) ml/min/1.73m2   Manual Differential    Collection Time: 10/16/17  5:34 AM   Result Value Ref Range    Differential, manual see below    Anaerobic and Aerobic Culture    Collection Time: 10/17/17  1:00 AM   Result Value Ref Range    Gram Stain Result       Rare segmented neutrophils observed. Rare epithelial cells observed. Rare gram positive cocci in pairs.   Rare budding yeast.     PT    Collection Time: 10/17/17  5:23 AM   Result Value Ref Range    INR 1.11 0.85 - 7.09   Basic Metabolic Panel    Collection Time: 10/17/17  5:23 AM   Result Value Ref Range    Sodium 143 135 - 145 meq/L    Potassium 4.6 3.5 - 5.2 meq/L    Chloride

## 2017-10-18 NOTE — PROGRESS NOTES
Pt transported to [facility] with [facility]. Pt resting with no complaints at this time. Will call son Regino Aquino to update of pt departure.

## 2017-10-18 NOTE — FLOWSHEET NOTE
10/17/17 2040   Encounter Summary   Services provided to: Patient and family together   Referral/Consult From: [address] Family members   Continue Visiting (10/17 continue support)   Complexity of Encounter Moderate   Length of Encounter 15 minutes   Routine   Type Follow up   Assessment Sleeping   Intervention Nurtured hope;Prayer   Outcome Coping       Pt was sleeping. Spiritual and emotional care was provided to patient family. Family was informed chaplains are available to address spiritual needs. Prayed for presence of the Marcello Martins, and blessings for the patient and comfort and strength for family. Care Plan:  Provide emotional and spiritual support.

## 2017-10-18 NOTE — PROGRESS NOTES
[facility]  OCCUPATIONAL THERAPY MISSED TREATMENT NOTE  ACUTE CARE    Date: 10/18/2017  Patient Name: Stefan Hernandez        CSN: [id number]   : 1940  (76 y.o.)  Gender: male                REASON FOR MISSED TREATMENT:  Missed Treat. Family has declined to place Pt on comfort measures only at this time with probable transition to hospice. Will defer OT eval at this time.

## 2017-10-18 NOTE — PROGRESS NOTES
Hospice notified of the consult. 0830  [facility], [address] notified of discussion and probable plan of returning to [facility] with hospice.

## 2017-10-18 NOTE — PROGRESS NOTES
[facility] THERAPY MISSED TREATMENT NOTE  [facility] CCU 3A      Date: 10/18/2017  Patient Name: José Luis Anderson        MRN: [id number]    : 1940  (76 y.o.)    REASON FOR MISSED TREATMENT:    Pt transitioning to comfort care with hospice meeting planned for today. No further speech services indicated. Plan to sign off at this time. Please reconsult if status changes.       Ashley Dennis M.S. [id number]

## 2017-10-18 NOTE — PROGRESS NOTES
INFECTIOUS DISEASES  PROGRESS NOTE    Pt Name: Rosebud Schlatter  MRN: [id number]  [id number]  YOB: 1940  Admit Date: 10/12/2017 10:02 PM  Date of evaluation: 10/18/2017  Primary Care Physician: Justin Nowak MD   [id number]   66-year-old male with a history of stroke and also problems with dementia, Alzheimer's  disease, GERD, BPH, and history of suprapubic catheter in the past and  also significant problems with the patient undergoing right lower  extremity AKA amputation done. The patient had this amputation done 8  weeks ago and prior to this admission on 10/13/2017. The patient had  been noted to be hypoxemic and had been placed on BiPAP. Noted to be  also hypotensive and hypoxic. Had c.difficle positive on 9/20/17   And repeat c.difficle 10/15/17 so changed to tigacil and vanco  Subjective: Interval History: patient in bed and resting. Family deciding on hospice     Active ATBs: zosyn vanco 10/13 - 10/15  tigacil and vanco 10/15  Flagyl 10/16    Pt/Chart review:  Fever No, Diarrhea mild, Dyspnea yes      Data:   Scheduled Meds:   sodium chloride  1,000 mL Intravenous Once    dextrose  25 g Intravenous Once     Continuous Infusions:   dextrose 5 % and 0.45 % NaCl 20 mL/hr at 10/17/17 1900       I & O's:  I/O last 3 completed shifts: In: 1708.1 [I.V.:1420.1; NG/GT:288]  Out: 301 [Urine:500; Stool:275]  No intake/output data recorded.     Intake/Output Summary (Last 24 hours) at 10/18/17 0807  Last data filed at 10/18/17 0649   Gross per 24 hour   Intake          1708.08 ml   Output              775 ml   Net           933.08 ml       Date 10/18/17 0000 - 10/18/17 2359   Shift 5902-2489 4307-7702 3169-0796 24 Hour Total   I  N  T  A  K  E   P.O.  (mL/kg/hr) 0  (0)   0    I.V.  (mL/kg) 141  (1.9)   141  (1.9)    Shift Total  (mL/kg) 141  (1.9)   141  (1.9)   O  U  T  P  U  T   Urine  (mL/kg/hr) 175  (0.3)   175    Stool  (mL/kg) 75  (1)   75  (1)    Shift Total  (mL/kg) 250  (3.3) Units: meq/l 86.3   Sodium, Ur Latest Units: meq/l 24     MICRO:   Cultures no growth    IMAGING:   10/14 cxr  Impression   Slightly worsening bibasilar atelectasis/infiltrate.             Objective:   Vitals: BP 94/66   Pulse 81   Temp 98.2 °F (36.8 °C) (Core)   Resp 22   Ht 5' 4\" (1.626 m)   Wt 167 lb 1.7 oz (75.8 kg)   SpO2 94%   BMI 28.68 kg/m²   HEENT: Head: Normocephalic, no lesions, without obvious abnormality. Lungs: dyspneic -- crackles in bases posteriorly   Heart: S1, S2 normal  Abdomen: soft, non-tender; bowel sounds normal; no masses,  no organomegaly NG +   Extremities: extremities normal, atraumatic, no cyanosis or edema  Neurologic: Mental status:more alert    Wound/Ulcers: right AKA site stable        Assessment:   1. Severe sepsis with shock. 2.  Aspiration pneumonia. 3.   S/P Acute respiratory failure. 4.   dysphagia   5. History of prostate problems with BPH. 6.  Peripheral vascular disease, status post AKA done 2 months prior  to this hospitalization. 7.  Dementia. 8.  Acute kidney injury. 9.  COPD. 10.   C. difficile     Patient Active Problem List:     Depression     GERD (gastroesophageal reflux disease)     Cerebrovascular accident, hemorrhagic ([address]     Multiple fractures of ribs of right side     Obesity (BMI 30-39. 9)     Obstructive sleep apnea treated with BiPAP     Back pain     Fall     Unable to ambulate     Dysphagia     Acute midline low back pain without sciatica     Inability to walk     Mental confusion     Late onset Alzheimer's disease with behavioral disturbance  and mental confusion     Chest wall pain  left lower chest     Dyspnea and respiratory abnormalities     Sepsis ([address]     Acute respiratory distress     Hematemesis     Blood poisoning ([address]     Pneumonia of both lower lobes due to infectious organism     Severe malnutrition (HCC)     Acute respiratory failure with hypoxia (HCC)     Acute kidney injury ([address]     Hyperkalemia     Hypocalcemia

## 2017-10-18 NOTE — PROGRESS NOTES
Nutrition Assessment    Type and Reason for Visit: Reassess    Nutrition Recommendations: RD available as needed. Malnutrition Assessment:  · Malnutrition Status: Meets the criteria for severe malnutrition  · Context: Acute illness or injury  · Findings of the 6 clinical characteristics of malnutrition (Minimum of 2 out of 6 clinical characteristics is required to make the diagnosis of moderate or severe Protein Calorie Malnutrition based on AND/ASPEN Guidelines):  1. Energy Intake-Less than or equal to 50%, greater than or equal to 5 days    2. Weight Loss- (15% wt loss in 3 months not counting AKA),    3. Muscle Loss-Severe muscle mass loss, Temples (temporalis muscle)    Nutrition Diagnosis:   · Problem: Severe malnutrition  · Etiology: related to Catabolic illness     Signs and symptoms:  as evidenced by Severe muscle loss, Diet history of poor intake (15% wt loss in 3 months)    Nutrition Assessment:  · Subjective Assessment: TF is off for comfort care per RN.    · Nutrition-Focused Physical Findings:    · Wound Type:  (10/13 per Makayla President RN pt with some excoriation around coccyx  & AKA stump healing well. )  · Current Nutrition Therapies:  · Oral Diet Orders: NPO   · Oral Diet intake: NPO  · Oral Nutrition Supplement (ONS) Orders: None  · ONS intake: NPO  · Anthropometric Measures:  · Ht: 5' 4\" (162.6 cm)   · Current Body Wt: 167 lb 1.7 oz (75.8 kg)  · Admission Body Wt: 152 lb 5.4 oz (69.1 kg) (10/13)  · Usual Body Wt:  (7/15/17 pt weighed 205#  & had AKA in  September  & continued to lose wt.  pt weighed ~180-190# afer AKA with speaking to daughter)  · % Weight Change: 19% in 3 months not counting AKA,     · Ideal Body Wt: 120 lb (54.4 kg) (adjusted for AKA), BMI Classification: BMI 30.0 - 34.9 Obese Class I  · Comparative Standards (Estimated Nutrition Needs):  · Estimated Daily Total Kcal: ~1727 kcals ( 25 kcals/kg on admit wt of 69.1 kg)  · Estimated Daily Protein (g): ~70 grams ( 1 gram protein/kg on

## 2017-10-22 LAB
AEROBIC CULTURE: ABNORMAL
ANAEROBIC CULTURE: ABNORMAL
GRAM STAIN RESULT: ABNORMAL
ORGANISM: ABNORMAL
ORGANISM: ABNORMAL

## 2018-03-19 NOTE — ED NOTES
Attempted to discuss instructions with patient for 03/20/18, but phone went to voicemail. Included in message left was call back number to verify holds on blood thinners.   Pt resting on cot. VSS. Respires easy and unlabored. Will continue to monitor.       Rosita Tan RN  09/20/17 4019

## 2021-06-10 NOTE — CONSULTS
Pt to ED from home for an episode of nausea, heart racing, anxiety and feeling warm at 2200 tonight. Pt states that this has happened to him before when he had heat exhaustion. Pt in no distress upon arrival. Pt states most of this has gone away, now he just feels \"crappy\". Pt in no discomfort. Vss.    [facility]    Patient - Brigette Morris   MRN -  [id number]   [facility] # - [[id number]]   - 1940      Date of Admission -  10/12/2017 10:02 PM  Date of evaluation -  10/15/2017  [id number] Farnces MD  Primary Care Physician - Orly Sharpe, [address] - [address]  Chief Complaint/Reason for consult   Continue care for pneumonia, hypoxemia  Active Hospital Problem List      Active Hospital Problems    Diagnosis Date Noted    Mental confusion [R41.0] 2017     Priority: High     Class: Acute    Late onset Alzheimer's disease with behavioral disturbance  and mental confusion [G30.1, F02.81] 2017     Priority: High     Class: Acute    Obstructive sleep apnea treated with BiPAP [G47.33] 2015     Priority: High    Acute respiratory failure with hypoxia (HCC) [J96.01]     Acute kidney injury ([address] [N17.9]     Hyperkalemia [E87.5]     Hypocalcemia [E83.51]     Urinary tract infection without hematuria [N39.0]     Aspiration pneumonia of both lower lobes due to gastric secretions ([address] [J69.0]     Coffee ground emesis [K92.0]     Benign prostatic hyperplasia [N40.0]     COPD without exacerbation ([address] [J44.9]     Unilateral AKA, right ([address] [Z89.611]     History of arterial occlusion [Z86.718]     Sepsis ([address] [A41.9] 10/13/2017    Acute respiratory distress [R06.03] 10/13/2017    Hematemesis [K92.0] 10/13/2017    Severe malnutrition ([address] [E43] 10/13/2017     Class: Acute    Blood poisoning ([address] [A41.9]     Pneumonia of both lower lobes due to infectious organism [J18.9]     Dyspnea and respiratory abnormalities [R06.00, R06.89]     Unable to ambulate [R26.2] 2016    Dysphagia [R13.10] 2016    GERD (gastroesophageal reflux disease) [K21.9] 10/09/2012     HPI   The patient is a 76 y.o. male with significant past medical history of multiple CVA most recent in , R AKA approximately 8 weeks ago potassium chloride **OR** potassium chloride, potassium chloride, bisacodyl, LORazepam, nitroGLYCERIN, oxyCODONE, sodium chloride flush, magnesium hydroxide, ondansetron, glucose, dextrose, glucagon (rDNA), dextrose, acetaminophen  IV   sodium bicarbonate infusion      dextrose       Home  Prescriptions Prior to Admission: warfarin (COUMADIN) 1 MG tablet, Take 0.5 mg by mouth daily  ferrous sulfate 325 (65 Fe) MG tablet, Take 325 mg by mouth daily (with breakfast)  folic acid (FOLVITE) 1 MG tablet, Take 1 mg by mouth daily  loratadine (CLARITIN) 10 MG tablet, Take 10 mg by mouth daily  omeprazole (PRILOSEC) 20 MG delayed release capsule, Take 20 mg by mouth nightly  potassium chloride (KLOR-CON M) 20 MEQ extended release tablet, Take 40 mEq by mouth daily  traZODone (DESYREL) 50 MG tablet, Take 50 mg by mouth nightly  lactobacillus (CULTURELLE) capsule, Take 1 capsule by mouth 2 times daily  oxyCODONE (ROXICODONE) 5 MG immediate release tablet, Take 5 mg by mouth 3 times daily . LORazepam (ATIVAN) 1 MG tablet, Take 1 mg by mouth every 6 hours as needed for Anxiety  bisacodyl (DULCOLAX) 10 MG suppository, Place 10 mg rectally daily as needed for Constipation  ibuprofen (ADVIL;MOTRIN) 200 MG tablet, Take 200 mg by mouth every 6 hours as needed for Pain  oxyCODONE (ROXICODONE) 5 MG immediate release tablet, Take 5 mg by mouth every 4 hours as needed for Pain .   finasteride (PROSCAR) 5 MG tablet, Take 5 mg by mouth daily  Multiple Vitamins-Minerals (CERTAGEN PO), Take 1 tablet by mouth daily   nitroGLYCERIN (NITROSTAT) 0.4 MG SL tablet, Place 1 tablet under the tongue every 5 minutes as needed for Chest pain  ipratropium-albuterol (DUONEB) 0.5-2.5 (3) MG/3ML SOLN nebulizer solution, Inhale 3 mLs into the lungs every 4 hours (while awake)  tamsulosin (FLOMAX) 0.4 MG capsule, Take 1 capsule by mouth daily  levothyroxine (SYNTHROID) 100 MCG tablet, Take 100 mcg by mouth Daily  mirtazapine (REMERON) 15 MG tablet, Take 1 shifts: In: 4262.1 [I.V.:3517. 1; NG/GT:745]  Out: 395 [Urine:395]     Date 10/15/17 0000 - 10/15/17 2359   Shift 9530-2952 2327-3881 7918-8203 24 Hour Total   I  N  T  A  K  E   I.V.  (mL/kg) 1439.9  (20.8)   1439.9  (20.8)    NG/GT  (mL/kg) 472  (6.8)   472  (6.8)    Shift Total  (mL/kg) 1911.9  (27.6)   1911.9  (27.6)   O  U  T  P  U  T   Urine  (mL/kg/hr) 175  (0.3)   175    Shift Total  (mL/kg) 175  (2.5)   175  (2.5)   Weight (kg) 69.3 69.3 69.3 69.3     Patient Vitals for the past 96 hrs (Last 3 readings):   Weight   10/13/17 2030 152 lb 13.9 oz (69.3 kg)   10/13/17 0438 152 lb 5.4 oz (69.1 kg)   10/13/17 0114 190 lb (86.2 kg)     Lines, ET tube, Devices, Drains   PIV, Conte  Exam   Physical Exam   Constitutional: Confused, agitated--mildly  Cardiovascular:Mildly tachycardic  Pulmonary/Chest: Bilateral rales  Abdominal: + bowel sounds  Musculoskeletal:  RLE stump  Neurological: Disoriented, confused, mildly agitated    Labs   ABG  Lab Results   Component Value Date    PH 7.50 10/14/2017    PO2 62 10/14/2017    PCO2 24 10/14/2017    HCO3 19 10/14/2017    O2SAT 94 10/14/2017     Lab Results   Component Value Date    IFIO2 25 10/14/2017    MODE CPAP/PS 10/14/2017    SETPEEP 7.0 10/14/2017     CBC  Recent Labs      10/13/17   0603  10/14/17   0419  10/15/17   0330   WBC  24.4*  21.6*  28.3*   RBC  4.18*  3.71*  3.60*   HGB  12.0*  10.6*  10.2*   HCT  37.8*  33.4*  32.2*   MCV  90.5  90.1  89.4   MCH  28.8  28.6  28.4   MCHC  31.8*  31.7*  31.8*   RDW  18.2*  18.2*  18.3*   PLT  301  242  229   MPV  8.4  8.9  9.7      BMP  Recent Labs      10/13/17   0510  10/13/17   1718  10/14/17   0419  10/15/17   0330   NA  139  139  143  143   K  6.6*  5.6*  5.4*  3.3*   CL  99  102  107  113*   CO2  21*  22*  20*  18*   BUN  29*  38*  43*  44*   CREATININE  1.8*  2.0*  1.9*  1.7*   GLUCOSE  142*  123*  107  144*   MG  2.0   --   2.1  2.0   PHOS  4.5   --   3.8  3.0   CALCIUM  8.3*  8.4*  8.0*  7.0*     LFT  Recent Labs 10/12/17   2225  10/14/17   0419   AST  18  22   ALT  15  14   BILITOT  0.6  0.5   ALKPHOS  118  98   LIPASE  11.6  6.9     TROP  Lab Results   Component Value Date    TROPONINT 0.019 10/13/2017    TROPONINT 0.021 10/13/2017    TROPONINT 0.030 10/13/2017     BNP  No results for input(s): BNP in the last 72 hours. Lactic Acid  Recent Labs      10/13/17   0812  10/14/17   0419  10/15/17   0330   LACTA  3.4*  2.4*  2.4*     INR  Recent Labs      10/13/17   0510  10/14/17   0419  10/15/17   0330   INR  1.35*  1.31*  1.16*     PTT  Recent Labs      10/12/17   2225  10/14/17   0419   APTT  30.0  31.9     Glucose  No results for input(s): POCGLU in the last 72 hours. UA   Recent Labs      10/12/17   2216  10/13/17   0500   SPECGRAV   --   >1.030*   PHUR  8.0  5.0   COLORU  DK YELLOW*  DARK BROWN   PROTEINU  100*  100*   BLOODU  LARGE*  LARGE*   RBCUA  3-5  15-20   WBCUA  10-15     BACTERIA  MANY  MODERATE   NITRU  POSITIVE*  POSITIVE*   GLUCOSEU  NEGATIVE   --    BILIRUBINUR  SMALL*  MODERATE*   UROBILINOGEN  0.2  0.2   KETUA  NEGATIVE  TRACE*   LABCAST   --   0-4 HYALINE   . EKG   10/12/17  Sinus tachycardia with Fusion complexes  Right superior axis deviation  Pulmonary disease pattern  Cannot rule out Inferior infarct , age undetermined  Abnormal ECG  When compared with ECG of 23-AUG-2017 19:19,  Significant changes have occurred    Echo   07/03/17  Summary   Technically difficult examination. Normal left ventricle size and systolic function. Ejection fraction was   estimated at 55 %. There were no regional left ventricular wall motion   abnormalities and wall thickness was within normal limits. Doppler parameters were consistent with abnormal left ventricular   relaxation (grade 1 diastolic dysfunction).     Cultures   Procalcitonin   Lab Results   Component Value Date    PROCAL 13.54 10/15/2017    PROCAL 21.98 10/14/2017    PROCAL 14.42 10/13/2017   Rapid Flu A/B-negative  Blood Culture- No prelim growth  MRSA-sent  VRE-sent  UA-sent  Resp culture-  Radiology:      KUB  10/13/2017   1. Findings consistent with mild ileus. 2. Constipation and possible fecal impaction within the rectum are possible. CXR  10/14/17         Impression   Slightly worsening bibasilar atelectasis/infiltrate.               10/13/17  10/12/2017   Interstitial edema. Possible developing peribronchial infiltrates at both bases      CT Scans    CT Chest  10/13/2017   1. There are patchy infiltrates within the lower lobes bilaterally suggesting a pneumonia. There are also vague radiodensities within the upper chest bilaterally measuring 0.5 cm on the right and 0.6 cm on the left suggesting either focal consolidation or a lung nodule (series 2 image 16). 2. There is circumferential thickening of the distal esophagus. An esophagram could be performed for additional evaluation. 3. Numerous additional findings as described in the body of the report. (See actual reports for details)  SYSTEMS ASSESSMENT AND PLANS     CNS   Remains confused and disoriented  PMH of CVA, Dementia, Alzheimer's  Respiratory   Stable oxygenation and ventilation on 2 lpm NC  Poss aspiration pneumonia on good ATB coverage  Needs SLP avl before oral ingestion is recommended   CINTHIA CPAP to sleep  Cardiovascular   Tachycardia  Fluid volume is better  Social/Spiritual/DNR/Miscellaneous   Limited code X 4  BRANDI with metabolic acidosis  No improving multiple organ systems involved  Pneumonia/Urosepsis  Severe baseline dementia   Consider comfort care o/w continue current interventions, monitor fluid balance  Thank you for the consult and allowing us to participate in the care of your patient. Questions and concerns addressed. Meds and Orders reviewed.     Electronically signed by     Tasha Candelaria MD on 10/15/2017 at 9:29 AM